# Patient Record
Sex: MALE | Race: BLACK OR AFRICAN AMERICAN | Employment: FULL TIME | URBAN - METROPOLITAN AREA
[De-identification: names, ages, dates, MRNs, and addresses within clinical notes are randomized per-mention and may not be internally consistent; named-entity substitution may affect disease eponyms.]

---

## 2017-01-03 ENCOUNTER — OFFICE VISIT (OUTPATIENT)
Dept: FAMILY MEDICINE CLINIC | Age: 47
End: 2017-01-03

## 2017-01-03 VITALS — DIASTOLIC BLOOD PRESSURE: 90 MMHG | SYSTOLIC BLOOD PRESSURE: 128 MMHG | BODY MASS INDEX: 40.44 KG/M2 | WEIGHT: 298.2 LBS

## 2017-01-03 DIAGNOSIS — I10 ESSENTIAL HYPERTENSION: Primary | Chronic | ICD-10-CM

## 2017-01-03 PROCEDURE — 99213 OFFICE O/P EST LOW 20 MIN: CPT | Performed by: FAMILY MEDICINE

## 2017-02-01 RX ORDER — LOSARTAN POTASSIUM AND HYDROCHLOROTHIAZIDE 25; 100 MG/1; MG/1
TABLET ORAL
Qty: 30 TABLET | Refills: 3 | Status: SHIPPED | OUTPATIENT
Start: 2017-02-01 | End: 2017-06-19 | Stop reason: SDUPTHER

## 2017-02-01 RX ORDER — OMEPRAZOLE 40 MG/1
CAPSULE, DELAYED RELEASE ORAL
Qty: 30 CAPSULE | Refills: 3 | Status: SHIPPED | OUTPATIENT
Start: 2017-02-01 | End: 2017-06-19 | Stop reason: SDUPTHER

## 2017-02-01 RX ORDER — METOPROLOL SUCCINATE 50 MG/1
TABLET, EXTENDED RELEASE ORAL
Qty: 30 TABLET | Refills: 3 | Status: SHIPPED | OUTPATIENT
Start: 2017-02-01 | End: 2017-06-19 | Stop reason: SDUPTHER

## 2017-02-28 RX ORDER — ATORVASTATIN CALCIUM 20 MG/1
TABLET, FILM COATED ORAL
Qty: 30 TABLET | Refills: 3 | Status: SHIPPED | OUTPATIENT
Start: 2017-02-28 | End: 2017-07-17 | Stop reason: SDUPTHER

## 2017-02-28 RX ORDER — AMLODIPINE BESYLATE 5 MG/1
TABLET ORAL
Qty: 30 TABLET | Refills: 3 | Status: SHIPPED | OUTPATIENT
Start: 2017-02-28 | End: 2017-07-17 | Stop reason: SDUPTHER

## 2017-05-01 ENCOUNTER — OFFICE VISIT (OUTPATIENT)
Dept: FAMILY MEDICINE CLINIC | Age: 47
End: 2017-05-01

## 2017-05-01 VITALS
DIASTOLIC BLOOD PRESSURE: 90 MMHG | HEART RATE: 60 BPM | BODY MASS INDEX: 41.96 KG/M2 | OXYGEN SATURATION: 98 % | WEIGHT: 309.4 LBS | SYSTOLIC BLOOD PRESSURE: 122 MMHG

## 2017-05-01 DIAGNOSIS — I10 ESSENTIAL HYPERTENSION: Primary | Chronic | ICD-10-CM

## 2017-05-01 DIAGNOSIS — E78.5 HYPERLIPIDEMIA, UNSPECIFIED HYPERLIPIDEMIA TYPE: Chronic | ICD-10-CM

## 2017-05-01 PROCEDURE — 99213 OFFICE O/P EST LOW 20 MIN: CPT | Performed by: FAMILY MEDICINE

## 2017-06-19 RX ORDER — LOSARTAN POTASSIUM AND HYDROCHLOROTHIAZIDE 25; 100 MG/1; MG/1
TABLET ORAL
Qty: 30 TABLET | Refills: 3 | Status: SHIPPED | OUTPATIENT
Start: 2017-06-19 | End: 2017-11-10 | Stop reason: SDUPTHER

## 2017-06-19 RX ORDER — OMEPRAZOLE 40 MG/1
CAPSULE, DELAYED RELEASE ORAL
Qty: 30 CAPSULE | Refills: 3 | Status: SHIPPED | OUTPATIENT
Start: 2017-06-19 | End: 2017-11-10 | Stop reason: SDUPTHER

## 2017-06-19 RX ORDER — METOPROLOL SUCCINATE 50 MG/1
TABLET, EXTENDED RELEASE ORAL
Qty: 30 TABLET | Refills: 3 | Status: SHIPPED | OUTPATIENT
Start: 2017-06-19 | End: 2017-11-10 | Stop reason: SDUPTHER

## 2017-07-17 RX ORDER — AMLODIPINE BESYLATE 5 MG/1
TABLET ORAL
Qty: 30 TABLET | Refills: 1 | Status: SHIPPED | OUTPATIENT
Start: 2017-07-17 | End: 2017-09-16 | Stop reason: SDUPTHER

## 2017-07-17 RX ORDER — ATORVASTATIN CALCIUM 20 MG/1
TABLET, FILM COATED ORAL
Qty: 30 TABLET | Refills: 1 | Status: SHIPPED | OUTPATIENT
Start: 2017-07-17 | End: 2017-09-16 | Stop reason: SDUPTHER

## 2017-10-10 ENCOUNTER — TELEPHONE (OUTPATIENT)
Dept: FAMILY MEDICINE CLINIC | Age: 47
End: 2017-10-10

## 2017-10-17 ENCOUNTER — TELEPHONE (OUTPATIENT)
Dept: FAMILY MEDICINE CLINIC | Age: 47
End: 2017-10-17

## 2017-10-19 PROBLEM — D12.6 ADENOMATOUS POLYP OF COLON: Status: ACTIVE | Noted: 2017-10-19

## 2017-10-23 RX ORDER — AMLODIPINE BESYLATE 5 MG/1
TABLET ORAL
Qty: 30 TABLET | Refills: 0 | Status: SHIPPED | OUTPATIENT
Start: 2017-10-23 | End: 2017-11-23 | Stop reason: SDUPTHER

## 2017-10-23 RX ORDER — ATORVASTATIN CALCIUM 20 MG/1
TABLET, FILM COATED ORAL
Qty: 30 TABLET | Refills: 0 | OUTPATIENT
Start: 2017-10-23

## 2017-11-10 RX ORDER — ATORVASTATIN CALCIUM 20 MG/1
TABLET, FILM COATED ORAL
Qty: 30 TABLET | Refills: 0 | OUTPATIENT
Start: 2017-11-10

## 2017-11-10 RX ORDER — METOPROLOL SUCCINATE 50 MG/1
TABLET, EXTENDED RELEASE ORAL
Qty: 30 TABLET | Refills: 0 | Status: SHIPPED | OUTPATIENT
Start: 2017-11-10 | End: 2017-11-27 | Stop reason: SDUPTHER

## 2017-11-10 RX ORDER — LOSARTAN POTASSIUM AND HYDROCHLOROTHIAZIDE 25; 100 MG/1; MG/1
TABLET ORAL
Qty: 30 TABLET | Refills: 0 | Status: SHIPPED | OUTPATIENT
Start: 2017-11-10 | End: 2017-11-27 | Stop reason: SDUPTHER

## 2017-11-10 RX ORDER — OMEPRAZOLE 40 MG/1
CAPSULE, DELAYED RELEASE ORAL
Qty: 30 CAPSULE | Refills: 0 | Status: SHIPPED | OUTPATIENT
Start: 2017-11-10 | End: 2017-11-27 | Stop reason: SDUPTHER

## 2017-11-10 NOTE — TELEPHONE ENCOUNTER
Pt came into office to request medication refills:    losartan-hydrochlorothiazide (HYZAAR) 100-25 MG per tablet  atorvastatin (LIPITOR) 20 MG tablet  omeprazole (PRILOSEC) 40 MG delayed release capsule  metoprolol succinate (TOPROL XL) 50 MG extended release tablet

## 2017-11-25 ENCOUNTER — HOSPITAL ENCOUNTER (OUTPATIENT)
Dept: OTHER | Age: 47
Discharge: HOME | End: 2017-11-25
Attending: FAMILY MEDICINE | Admitting: FAMILY MEDICINE

## 2017-11-25 DIAGNOSIS — E78.5 HYPERLIPIDEMIA, UNSPECIFIED HYPERLIPIDEMIA TYPE: Chronic | ICD-10-CM

## 2017-11-25 DIAGNOSIS — I10 ESSENTIAL HYPERTENSION: Chronic | ICD-10-CM

## 2017-11-25 LAB
A/G RATIO: 1.4 (ref 1.1–2.2)
ALBUMIN SERPL-MCNC: 4.2 G/DL (ref 3.4–5)
ALP BLD-CCNC: 68 U/L (ref 40–129)
ALT SERPL-CCNC: 22 U/L (ref 10–40)
ANION GAP SERPL CALCULATED.3IONS-SCNC: 14 MMOL/L (ref 3–16)
AST SERPL-CCNC: 21 U/L (ref 15–37)
BASOPHILS ABSOLUTE: 0 K/UL (ref 0–0.2)
BASOPHILS RELATIVE PERCENT: 1.2 %
BILIRUB SERPL-MCNC: 0.8 MG/DL (ref 0–1)
BUN BLDV-MCNC: 14 MG/DL (ref 7–20)
CALCIUM SERPL-MCNC: 9.6 MG/DL (ref 8.3–10.6)
CHLORIDE BLD-SCNC: 105 MMOL/L (ref 99–110)
CHOLESTEROL, TOTAL: 220 MG/DL (ref 0–199)
CO2: 28 MMOL/L (ref 21–32)
CREAT SERPL-MCNC: 1.2 MG/DL (ref 0.9–1.3)
CREATININE URINE: 144.2 MG/DL (ref 39–259)
EOSINOPHILS ABSOLUTE: 0.1 K/UL (ref 0–0.6)
EOSINOPHILS RELATIVE PERCENT: 3.6 %
GFR AFRICAN AMERICAN: >60
GFR NON-AFRICAN AMERICAN: >60
GLOBULIN: 2.9 G/DL
GLUCOSE BLD-MCNC: 95 MG/DL (ref 70–99)
HCT VFR BLD CALC: 45 % (ref 40.5–52.5)
HDLC SERPL-MCNC: 57 MG/DL (ref 40–60)
HEMOGLOBIN: 15.2 G/DL (ref 13.5–17.5)
LDL CHOLESTEROL CALCULATED: 132 MG/DL
LYMPHOCYTES ABSOLUTE: 1.7 K/UL (ref 1–5.1)
LYMPHOCYTES RELATIVE PERCENT: 45.4 %
MCH RBC QN AUTO: 30.6 PG (ref 26–34)
MCHC RBC AUTO-ENTMCNC: 33.7 G/DL (ref 31–36)
MCV RBC AUTO: 91 FL (ref 80–100)
MICROALBUMIN UR-MCNC: <1.2 MG/DL
MICROALBUMIN/CREAT UR-RTO: NORMAL MG/G (ref 0–30)
MONOCYTES ABSOLUTE: 0.4 K/UL (ref 0–1.3)
MONOCYTES RELATIVE PERCENT: 10.7 %
NEUTROPHILS ABSOLUTE: 1.5 K/UL (ref 1.7–7.7)
NEUTROPHILS RELATIVE PERCENT: 39.1 %
PDW BLD-RTO: 13.1 % (ref 12.4–15.4)
PLATELET # BLD: 231 K/UL (ref 135–450)
PMV BLD AUTO: 8.9 FL (ref 5–10.5)
POTASSIUM SERPL-SCNC: 4.8 MMOL/L (ref 3.5–5.1)
RBC # BLD: 4.95 M/UL (ref 4.2–5.9)
SODIUM BLD-SCNC: 147 MMOL/L (ref 136–145)
TOTAL PROTEIN: 7.1 G/DL (ref 6.4–8.2)
TRIGL SERPL-MCNC: 156 MG/DL (ref 0–150)
VLDLC SERPL CALC-MCNC: 31 MG/DL
WBC # BLD: 3.8 K/UL (ref 4–11)

## 2017-11-26 LAB
ESTIMATED AVERAGE GLUCOSE: 116.9 MG/DL
HBA1C MFR BLD: 5.7 %

## 2017-11-27 ENCOUNTER — OFFICE VISIT (OUTPATIENT)
Dept: FAMILY MEDICINE CLINIC | Age: 47
End: 2017-11-27

## 2017-11-27 VITALS
HEART RATE: 85 BPM | BODY MASS INDEX: 37.38 KG/M2 | SYSTOLIC BLOOD PRESSURE: 120 MMHG | DIASTOLIC BLOOD PRESSURE: 84 MMHG | WEIGHT: 275.6 LBS | OXYGEN SATURATION: 97 %

## 2017-11-27 DIAGNOSIS — E78.49 OTHER HYPERLIPIDEMIA: Chronic | ICD-10-CM

## 2017-11-27 DIAGNOSIS — K21.9 GASTROESOPHAGEAL REFLUX DISEASE, ESOPHAGITIS PRESENCE NOT SPECIFIED: ICD-10-CM

## 2017-11-27 DIAGNOSIS — I10 ESSENTIAL HYPERTENSION: Primary | Chronic | ICD-10-CM

## 2017-11-27 PROCEDURE — G8484 FLU IMMUNIZE NO ADMIN: HCPCS | Performed by: FAMILY MEDICINE

## 2017-11-27 PROCEDURE — 1036F TOBACCO NON-USER: CPT | Performed by: FAMILY MEDICINE

## 2017-11-27 PROCEDURE — G8427 DOCREV CUR MEDS BY ELIG CLIN: HCPCS | Performed by: FAMILY MEDICINE

## 2017-11-27 PROCEDURE — G8417 CALC BMI ABV UP PARAM F/U: HCPCS | Performed by: FAMILY MEDICINE

## 2017-11-27 PROCEDURE — 99214 OFFICE O/P EST MOD 30 MIN: CPT | Performed by: FAMILY MEDICINE

## 2017-11-27 RX ORDER — OMEPRAZOLE 40 MG/1
CAPSULE, DELAYED RELEASE ORAL
Qty: 30 CAPSULE | Refills: 5 | Status: SHIPPED | OUTPATIENT
Start: 2017-11-27 | End: 2018-08-24 | Stop reason: SDUPTHER

## 2017-11-27 RX ORDER — LOSARTAN POTASSIUM AND HYDROCHLOROTHIAZIDE 25; 100 MG/1; MG/1
TABLET ORAL
Qty: 30 TABLET | Refills: 5 | Status: SHIPPED | OUTPATIENT
Start: 2017-11-27 | End: 2018-08-24 | Stop reason: SDUPTHER

## 2017-11-27 RX ORDER — METOPROLOL SUCCINATE 50 MG/1
TABLET, EXTENDED RELEASE ORAL
Qty: 30 TABLET | Refills: 5 | Status: SHIPPED | OUTPATIENT
Start: 2017-11-27 | End: 2018-08-24 | Stop reason: SDUPTHER

## 2017-11-27 RX ORDER — AMLODIPINE BESYLATE 5 MG/1
TABLET ORAL
Qty: 30 TABLET | Refills: 5 | Status: SHIPPED | OUTPATIENT
Start: 2017-11-27 | End: 2018-06-19 | Stop reason: SDUPTHER

## 2017-11-27 RX ORDER — ATORVASTATIN CALCIUM 20 MG/1
TABLET, FILM COATED ORAL
Qty: 30 TABLET | Refills: 5 | Status: SHIPPED | OUTPATIENT
Start: 2017-11-27 | End: 2018-07-17 | Stop reason: SDUPTHER

## 2017-11-27 NOTE — PROGRESS NOTES
History   Smoking Status    Former Smoker    Years: 10.00   Smokeless Tobacco    Never Used     PMH, surgeries, SH, FH, allergies reviewed. Medication list reviewed and updated. Chief Complaint   Patient presents with    Hypertension     Hypertension:  Denies chest pain, SOB, cough, visual changes, dizziness, palpitations or headache. He is adherent to a low sodium diet. He is  adherent to an exercise program.  He is  compliant with medications. Blood pressure typically runs unknown outside of the office. Objective:   VS reviewed    Vitals:    11/27/17 1439   BP: 120/84   Site: Left Arm   Position: Sitting   Cuff Size: Large Adult   Pulse: 85   SpO2: 97%   Weight: 275 lb 9.6 oz (125 kg)     Body mass index is 37.38 kg/m². Wt Readings from Last 3 Encounters:   11/27/17 275 lb 9.6 oz (125 kg)   10/16/17 297 lb 2.9 oz (134.8 kg)   07/01/17 300 lb (136.1 kg)     BP Readings from Last 3 Encounters:   11/27/17 120/84   10/16/17 122/70   07/01/17 130/70       Physical Exam   Constitutional: He is oriented to person, place, and time. No distress. Neck: No JVD present. No thyromegaly present. Cardiovascular: Normal rate, regular rhythm, normal heart sounds and intact distal pulses. No murmur heard. Pulmonary/Chest: Effort normal and breath sounds normal. No respiratory distress. Musculoskeletal: He exhibits no edema. Neurological: He is alert and oriented to person, place, and time.    Psychiatric: Mood, memory, affect and judgment normal.       Lab Results   Component Value Date     (H) 11/25/2017    K 4.8 11/25/2017     11/25/2017    CO2 28 11/25/2017    BUN 14 11/25/2017    CREATININE 1.2 11/25/2017    GLUCOSE 95 11/25/2017    CALCIUM 9.6 11/25/2017    PROT 7.1 11/25/2017    LABALBU 4.2 11/25/2017    BILITOT 0.8 11/25/2017    ALKPHOS 68 11/25/2017    AST 21 11/25/2017    ALT 22 11/25/2017    LABGLOM >60 11/25/2017    GFRAA >60 11/25/2017    AGRATIO 1.4 11/25/2017    GLOB 2.9 11/25/2017       Lab Results   Component Value Date    WBC 3.8 (L) 11/25/2017    HGB 15.2 11/25/2017    HCT 45.0 11/25/2017    MCV 91.0 11/25/2017     11/25/2017       Lab Results   Component Value Date    CHOL 220 (H) 11/25/2017    CHOL 145 10/13/2016    CHOL 164 12/30/2014     Lab Results   Component Value Date    TRIG 156 (H) 11/25/2017    TRIG 61 10/13/2016    TRIG 108 12/30/2014     Lab Results   Component Value Date    HDL 57 11/25/2017    HDL 59 10/13/2016    HDL 57 12/30/2014     Lab Results   Component Value Date    LDLCALC 132 (H) 11/25/2017    LDLCALC 74 10/13/2016    LDLCALC 85 12/30/2014     Lab Results   Component Value Date    LABVLDL 31 11/25/2017    LABVLDL 12 10/13/2016    LABVLDL 22 12/30/2014     No results found for: [facility]    Lab Results   Component Value Date    LABMICR <1.20 11/25/2017       Lab Results   Component Value Date    LABA1C 5.7 11/25/2017    LABA1C 5.5 10/13/2016    LABA1C 5.5 09/02/2014     Lab Results   Component Value Date    .9 11/25/2017       Assessment/Plan:    1. Essential hypertension  2. Other hyperlipidemia  well controlled HTN. Lipids are acceptable he has been off statin for about a month. Lipids were good while on statin. Restart statin. Reports o myalgias and normal hepatic function. Kidney function and screening for nephropathy are normal/negative. He is exercising and eating healthier he has lost 25 lbs in the last 6 months, patient was encouraged to continue a healthy lifestyle. 3. GERD: on omeprazole daily reports severe heartburn and significant night symptoms when tried to stopped taking. Return in about 6 months (around 5/27/2018) for HTN . Angela Webb received counseling on the following healthy behaviors: nutrition and exercise    Discussed use, benefit, and side effects of prescribed medications. Barriers to medication compliance addressed. All patient questions answered. Pt voiced understanding.

## 2018-01-15 ENCOUNTER — OFFICE VISIT (OUTPATIENT)
Dept: FAMILY MEDICINE CLINIC | Age: 48
End: 2018-01-15

## 2018-01-15 VITALS
DIASTOLIC BLOOD PRESSURE: 80 MMHG | SYSTOLIC BLOOD PRESSURE: 122 MMHG | HEART RATE: 81 BPM | WEIGHT: 300 LBS | BODY MASS INDEX: 40.69 KG/M2 | OXYGEN SATURATION: 98 %

## 2018-01-15 DIAGNOSIS — M54.41 CHRONIC RIGHT-SIDED LOW BACK PAIN WITH RIGHT-SIDED SCIATICA: Primary | ICD-10-CM

## 2018-01-15 DIAGNOSIS — G89.29 CHRONIC RIGHT-SIDED LOW BACK PAIN WITH RIGHT-SIDED SCIATICA: Primary | ICD-10-CM

## 2018-01-15 PROCEDURE — G8417 CALC BMI ABV UP PARAM F/U: HCPCS | Performed by: FAMILY MEDICINE

## 2018-01-15 PROCEDURE — 99213 OFFICE O/P EST LOW 20 MIN: CPT | Performed by: FAMILY MEDICINE

## 2018-01-15 PROCEDURE — 1036F TOBACCO NON-USER: CPT | Performed by: FAMILY MEDICINE

## 2018-01-15 PROCEDURE — G8484 FLU IMMUNIZE NO ADMIN: HCPCS | Performed by: FAMILY MEDICINE

## 2018-01-15 PROCEDURE — G8427 DOCREV CUR MEDS BY ELIG CLIN: HCPCS | Performed by: FAMILY MEDICINE

## 2018-01-15 RX ORDER — CYCLOBENZAPRINE HCL 10 MG
10 TABLET ORAL 3 TIMES DAILY PRN
Qty: 30 TABLET | Refills: 0 | Status: SHIPPED | OUTPATIENT
Start: 2018-01-15 | End: 2018-05-30

## 2018-01-15 NOTE — PROGRESS NOTES
History   Smoking Status    Former Smoker    Years: 10.00   Smokeless Tobacco    Never Used     PMH, surgeries, SH, FH, allergies reviewed. Medication list reviewed and updated. Chief Complaint   Patient presents with    Referral - General     Would like a referral to a specialist for sciatica      Seen at ER some days ago for right low back pain gradual onset, no injuries. He is currently on prednisone and flexeril and he is feeling better. Intensity 4/10/ Pain radiates down to the back of his right leg above his knee. No numbness or weakness of extremities. No urinary or stool incontinence. No dysuria or urgency. Has had low back pain for years with several excerebration in the last year. Has never has PT. Job requires for him to bends , reach, lift  and move side to side frequently. Objective:   VS reviewed    Vitals:    01/15/18 1539   BP: 122/80   Site: Left Arm   Position: Sitting   Cuff Size: Large Adult   Pulse: 81   SpO2: 98%   Weight: 300 lb (136.1 kg)     Body mass index is 40.69 kg/m². Wt Readings from Last 3 Encounters:   01/15/18 300 lb (136.1 kg)   01/11/18 (!) 303 lb 2.1 oz (137.5 kg)   12/13/17 299 lb 9.7 oz (135.9 kg)     BP Readings from Last 3 Encounters:   01/15/18 122/80   01/11/18 119/82   12/13/17 131/84       Physical Exam   Constitutional: He is oriented to person, place, and time. No distress. Cardiovascular: Normal rate and regular rhythm. Pulmonary/Chest: No respiratory distress. Abdominal: Soft. He exhibits no distension. There is no tenderness. Musculoskeletal:        Lumbar back: He exhibits spasm. He exhibits normal range of motion, no bony tenderness, no swelling, no edema and normal pulse. Back:    Normal straight leg bilateral, can heel and toe walk. Neurological: He is alert and oriented to person, place, and time. Skin: No rash noted. No erythema. No pallor. Assessment/Plan:    1.  Chronic right-sided low back pain with right-sided sciatica  Recent lumbar xray report reviewed. Course of disease and treatment expectations discussed. Flexeril PRN. Patient advised of medication side effects, including sedation. Advised to avoid driving and or operating machinery while taking this medication. Start PT referred spine pain/specialist   - [facility]  - Ambulatory referral to Orthopedic Surgery      Return for in may for HTN . Discussed use, benefit, and side effects of prescribed medications. Barriers to medication compliance addressed. All patient questions answered. Pt voiced understanding. Current Outpatient Prescriptions   Medication Sig Dispense Refill    cyclobenzaprine (FLEXERIL) 10 MG tablet Take 1 tablet by mouth 3 times daily as needed for Muscle spasms 30 tablet 0    atorvastatin (LIPITOR) 20 MG tablet TAKE 1 TABLET BY MOUTH EVERY DAY 30 tablet 5    amLODIPine (NORVASC) 5 MG tablet TAKE 1 TABLET BY MOUTH DAILY 30 tablet 5    losartan-hydrochlorothiazide (HYZAAR) 100-25 MG per tablet TAKE 1 TABLET BY MOUTH DAILY 30 tablet 5    omeprazole (PRILOSEC) 40 MG delayed release capsule TAKE 1 CAPSULE BY MOUTH DAILY 30 capsule 5    metoprolol succinate (TOPROL XL) 50 MG extended release tablet TAKE 1 TABLET BY MOUTH DAILY 30 tablet 5    ibuprofen (ADVIL;MOTRIN) 800 MG tablet Take 1 tablet by mouth every 8 hours as needed for Pain 20 tablet 0    predniSONE (DELTASONE) 10 MG tablet Take 4 tablets by mouth for 2 days, then 3, 2, 1 tablets daily for 1 day each. 14 tablet 0    methocarbamol (ROBAXIN-750) 750 MG tablet Take 1 tablet by mouth 4 times daily as needed (muscle spasm/pain) 30 tablet 0    naproxen (NAPROSYN) 500 MG tablet Take 1 tablet by mouth 2 times daily (with meals) 30 tablet 0    ibuprofen (ADVIL;MOTRIN) 600 MG tablet Take 1 tablet by mouth every 8 hours as needed for Pain 120 tablet 0     No current facility-administered medications for this visit.

## 2018-05-18 RX ORDER — EPINEPHRINE 0.3 MG/.3ML
INJECTION SUBCUTANEOUS
Status: CANCELLED | OUTPATIENT
Start: 2018-05-18

## 2018-05-18 RX ORDER — EPINEPHRINE 0.3 MG/.3ML
0.3 INJECTION SUBCUTANEOUS ONCE
Qty: 0.3 ML | Refills: 0 | Status: SHIPPED | OUTPATIENT
Start: 2018-05-18 | End: 2018-05-18

## 2018-05-30 ENCOUNTER — OFFICE VISIT (OUTPATIENT)
Dept: FAMILY MEDICINE CLINIC | Age: 48
End: 2018-05-30

## 2018-05-30 VITALS
SYSTOLIC BLOOD PRESSURE: 120 MMHG | OXYGEN SATURATION: 98 % | WEIGHT: 308 LBS | BODY MASS INDEX: 41.77 KG/M2 | HEART RATE: 75 BPM | DIASTOLIC BLOOD PRESSURE: 86 MMHG

## 2018-05-30 DIAGNOSIS — E78.49 OTHER HYPERLIPIDEMIA: Chronic | ICD-10-CM

## 2018-05-30 DIAGNOSIS — I10 ESSENTIAL HYPERTENSION: Primary | Chronic | ICD-10-CM

## 2018-05-30 PROCEDURE — G8417 CALC BMI ABV UP PARAM F/U: HCPCS | Performed by: FAMILY MEDICINE

## 2018-05-30 PROCEDURE — G8427 DOCREV CUR MEDS BY ELIG CLIN: HCPCS | Performed by: FAMILY MEDICINE

## 2018-05-30 PROCEDURE — 1036F TOBACCO NON-USER: CPT | Performed by: FAMILY MEDICINE

## 2018-05-30 PROCEDURE — 99213 OFFICE O/P EST LOW 20 MIN: CPT | Performed by: FAMILY MEDICINE

## 2018-05-30 RX ORDER — EPINEPHRINE 0.3 MG/.3ML
INJECTION INTRAMUSCULAR
Refills: 0 | COMMUNITY
Start: 2018-05-23

## 2018-07-13 DIAGNOSIS — I10 ESSENTIAL HYPERTENSION: Chronic | ICD-10-CM

## 2018-07-13 DIAGNOSIS — E78.49 OTHER HYPERLIPIDEMIA: Chronic | ICD-10-CM

## 2018-07-13 LAB
A/G RATIO: 1.9 (ref 1.1–2.2)
ALBUMIN SERPL-MCNC: 4.1 G/DL (ref 3.4–5)
ALP BLD-CCNC: 63 U/L (ref 40–129)
ALT SERPL-CCNC: 41 U/L (ref 10–40)
ANION GAP SERPL CALCULATED.3IONS-SCNC: 12 MMOL/L (ref 3–16)
AST SERPL-CCNC: 31 U/L (ref 15–37)
BASOPHILS ABSOLUTE: 0 K/UL (ref 0–0.2)
BASOPHILS RELATIVE PERCENT: 1 %
BILIRUB SERPL-MCNC: 1 MG/DL (ref 0–1)
BUN BLDV-MCNC: 15 MG/DL (ref 7–20)
CALCIUM SERPL-MCNC: 9.3 MG/DL (ref 8.3–10.6)
CHLORIDE BLD-SCNC: 108 MMOL/L (ref 99–110)
CHOLESTEROL, TOTAL: 156 MG/DL (ref 0–199)
CO2: 26 MMOL/L (ref 21–32)
CREAT SERPL-MCNC: 1 MG/DL (ref 0.9–1.3)
CREATININE URINE: 267.1 MG/DL (ref 39–259)
EOSINOPHILS ABSOLUTE: 0.1 K/UL (ref 0–0.6)
EOSINOPHILS RELATIVE PERCENT: 3.4 %
GFR AFRICAN AMERICAN: >60
GFR NON-AFRICAN AMERICAN: >60
GLOBULIN: 2.2 G/DL
GLUCOSE BLD-MCNC: 102 MG/DL (ref 70–99)
HCT VFR BLD CALC: 39.1 % (ref 40.5–52.5)
HDLC SERPL-MCNC: 52 MG/DL (ref 40–60)
HEMOGLOBIN: 13.9 G/DL (ref 13.5–17.5)
LDL CHOLESTEROL CALCULATED: 77 MG/DL
LYMPHOCYTES ABSOLUTE: 1.4 K/UL (ref 1–5.1)
LYMPHOCYTES RELATIVE PERCENT: 38 %
MCH RBC QN AUTO: 31 PG (ref 26–34)
MCHC RBC AUTO-ENTMCNC: 35.4 G/DL (ref 31–36)
MCV RBC AUTO: 87.6 FL (ref 80–100)
MICROALBUMIN UR-MCNC: <1.2 MG/DL
MICROALBUMIN/CREAT UR-RTO: ABNORMAL MG/G (ref 0–30)
MONOCYTES ABSOLUTE: 0.3 K/UL (ref 0–1.3)
MONOCYTES RELATIVE PERCENT: 7.2 %
NEUTROPHILS ABSOLUTE: 1.9 K/UL (ref 1.7–7.7)
NEUTROPHILS RELATIVE PERCENT: 50.4 %
PDW BLD-RTO: 13.1 % (ref 12.4–15.4)
PLATELET # BLD: 206 K/UL (ref 135–450)
PMV BLD AUTO: 8.4 FL (ref 5–10.5)
POTASSIUM SERPL-SCNC: 3.9 MMOL/L (ref 3.5–5.1)
RBC # BLD: 4.46 M/UL (ref 4.2–5.9)
SODIUM BLD-SCNC: 146 MMOL/L (ref 136–145)
TOTAL PROTEIN: 6.3 G/DL (ref 6.4–8.2)
TRIGL SERPL-MCNC: 136 MG/DL (ref 0–150)
VLDLC SERPL CALC-MCNC: 27 MG/DL
WBC # BLD: 3.8 K/UL (ref 4–11)

## 2018-10-26 RX ORDER — ATORVASTATIN CALCIUM 20 MG/1
TABLET, FILM COATED ORAL
Qty: 90 TABLET | Refills: 0 | Status: SHIPPED | OUTPATIENT
Start: 2018-10-26

## 2018-10-26 RX ORDER — LOSARTAN POTASSIUM AND HYDROCHLOROTHIAZIDE 25; 100 MG/1; MG/1
TABLET ORAL
Qty: 90 TABLET | Refills: 0 | Status: SHIPPED | OUTPATIENT
Start: 2018-10-26

## 2018-10-26 RX ORDER — METOPROLOL SUCCINATE 50 MG/1
TABLET, EXTENDED RELEASE ORAL
Qty: 90 TABLET | Refills: 0 | Status: SHIPPED | OUTPATIENT
Start: 2018-10-26

## 2018-10-26 RX ORDER — AMLODIPINE BESYLATE 5 MG/1
TABLET ORAL
Qty: 90 TABLET | Refills: 0 | Status: SHIPPED | OUTPATIENT
Start: 2018-10-26

## 2018-10-26 NOTE — TELEPHONE ENCOUNTER
Pt is requesting refills for amlodipine, atorvastatin, losartan and metoprolol.  Please pend to [facility]

## 2019-01-07 RX ORDER — OMEPRAZOLE 40 MG/1
CAPSULE, DELAYED RELEASE ORAL
Qty: 90 CAPSULE | Refills: 0 | Status: SHIPPED | OUTPATIENT
Start: 2019-01-07 | End: 2019-05-05 | Stop reason: SDUPTHER

## 2019-03-01 ENCOUNTER — TELEPHONE (OUTPATIENT)
Dept: ORTHOPEDIC SURGERY | Age: 49
End: 2019-03-01

## 2019-03-01 ENCOUNTER — OFFICE VISIT (OUTPATIENT)
Dept: ORTHOPEDIC SURGERY | Age: 49
End: 2019-03-01
Payer: COMMERCIAL

## 2019-03-01 ENCOUNTER — PRE-EVALUATION (OUTPATIENT)
Dept: ORTHOPEDIC SURGERY | Age: 49
End: 2019-03-01

## 2019-03-01 VITALS
RESPIRATION RATE: 16 BRPM | SYSTOLIC BLOOD PRESSURE: 117 MMHG | HEIGHT: 72 IN | BODY MASS INDEX: 41.72 KG/M2 | HEART RATE: 67 BPM | DIASTOLIC BLOOD PRESSURE: 76 MMHG | WEIGHT: 308 LBS

## 2019-03-01 DIAGNOSIS — G56.02 LEFT CARPAL TUNNEL SYNDROME: ICD-10-CM

## 2019-03-01 DIAGNOSIS — R20.0 HAND NUMBNESS: Primary | ICD-10-CM

## 2019-03-01 DIAGNOSIS — G56.02 CARPAL TUNNEL SYNDROME OF LEFT WRIST: ICD-10-CM

## 2019-03-01 PROCEDURE — 20526 THER INJECTION CARP TUNNEL: CPT | Performed by: ORTHOPAEDIC SURGERY

## 2019-03-01 PROCEDURE — APPNB15 APP NON BILLABLE TIME 0-15 MINS: Performed by: PHYSICIAN ASSISTANT

## 2019-03-01 PROCEDURE — 99243 OFF/OP CNSLTJ NEW/EST LOW 30: CPT | Performed by: ORTHOPAEDIC SURGERY

## 2019-05-06 RX ORDER — OMEPRAZOLE 40 MG/1
CAPSULE, DELAYED RELEASE ORAL
Qty: 90 CAPSULE | Refills: 0 | Status: SHIPPED | OUTPATIENT
Start: 2019-05-06

## 2020-02-24 ENCOUNTER — HOSPITAL ENCOUNTER (EMERGENCY)
Age: 50
Discharge: HOME | End: 2020-02-24
Attending: EMERGENCY MEDICINE
Payer: COMMERCIAL

## 2020-02-24 ENCOUNTER — APPOINTMENT (OUTPATIENT)
Dept: GENERAL RADIOLOGY | Age: 50
End: 2020-02-24
Payer: COMMERCIAL

## 2020-02-24 VITALS
WEIGHT: 307.98 LBS | HEIGHT: 72 IN | DIASTOLIC BLOOD PRESSURE: 78 MMHG | TEMPERATURE: 98.7 F | SYSTOLIC BLOOD PRESSURE: 105 MMHG | BODY MASS INDEX: 41.72 KG/M2 | HEART RATE: 66 BPM | RESPIRATION RATE: 9 BRPM | OXYGEN SATURATION: 98 %

## 2020-02-24 LAB
A/G RATIO: 1.2 (ref 1.1–2.2)
ALBUMIN SERPL-MCNC: 3.8 G/DL (ref 3.4–5)
ALP BLD-CCNC: 63 U/L (ref 40–129)
ALT SERPL-CCNC: 18 U/L (ref 10–40)
ANION GAP SERPL CALCULATED.3IONS-SCNC: 13 MMOL/L (ref 3–16)
AST SERPL-CCNC: 23 U/L (ref 15–37)
BASOPHILS ABSOLUTE: 0 K/UL (ref 0–0.2)
BASOPHILS RELATIVE PERCENT: 0.8 %
BILIRUB SERPL-MCNC: 0.9 MG/DL (ref 0–1)
BUN BLDV-MCNC: 14 MG/DL (ref 7–20)
CALCIUM SERPL-MCNC: 9.1 MG/DL (ref 8.3–10.6)
CHLORIDE BLD-SCNC: 101 MMOL/L (ref 99–110)
CO2: 25 MMOL/L (ref 21–32)
CREAT SERPL-MCNC: 1 MG/DL (ref 0.9–1.3)
EOSINOPHILS ABSOLUTE: 0.2 K/UL (ref 0–0.6)
EOSINOPHILS RELATIVE PERCENT: 4.2 %
GFR AFRICAN AMERICAN: >60
GFR NON-AFRICAN AMERICAN: >60
GLOBULIN: 3.3 G/DL
GLUCOSE BLD-MCNC: 95 MG/DL (ref 70–99)
HCT VFR BLD CALC: 42 % (ref 40.5–52.5)
HEMOGLOBIN: 14.5 G/DL (ref 13.5–17.5)
LIPASE: 16 U/L (ref 13–60)
LYMPHOCYTES ABSOLUTE: 1.3 K/UL (ref 1–5.1)
LYMPHOCYTES RELATIVE PERCENT: 28.5 %
MCH RBC QN AUTO: 30.8 PG (ref 26–34)
MCHC RBC AUTO-ENTMCNC: 34.5 G/DL (ref 31–36)
MCV RBC AUTO: 89.3 FL (ref 80–100)
MONOCYTES ABSOLUTE: 0.5 K/UL (ref 0–1.3)
MONOCYTES RELATIVE PERCENT: 10.2 %
NEUTROPHILS ABSOLUTE: 2.5 K/UL (ref 1.7–7.7)
NEUTROPHILS RELATIVE PERCENT: 56.3 %
PDW BLD-RTO: 12.8 % (ref 12.4–15.4)
PLATELET # BLD: 194 K/UL (ref 135–450)
PMV BLD AUTO: 8.1 FL (ref 5–10.5)
POTASSIUM REFLEX MAGNESIUM: 4.5 MMOL/L (ref 3.5–5.1)
RBC # BLD: 4.71 M/UL (ref 4.2–5.9)
SODIUM BLD-SCNC: 139 MMOL/L (ref 136–145)
TOTAL PROTEIN: 7.1 G/DL (ref 6.4–8.2)
TROPONIN: <0.01 NG/ML
WBC # BLD: 4.4 K/UL (ref 4–11)

## 2020-02-24 PROCEDURE — 85025 COMPLETE CBC W/AUTO DIFF WBC: CPT

## 2020-02-24 PROCEDURE — 36415 COLL VENOUS BLD VENIPUNCTURE: CPT

## 2020-02-24 PROCEDURE — 83690 ASSAY OF LIPASE: CPT

## 2020-02-24 PROCEDURE — 71045 X-RAY EXAM CHEST 1 VIEW: CPT

## 2020-02-24 PROCEDURE — 6370000000 HC RX 637 (ALT 250 FOR IP): Performed by: NURSE PRACTITIONER

## 2020-02-24 PROCEDURE — 80053 COMPREHEN METABOLIC PANEL: CPT

## 2020-02-24 PROCEDURE — 93005 ELECTROCARDIOGRAM TRACING: CPT | Performed by: NURSE PRACTITIONER

## 2020-02-24 PROCEDURE — 84484 ASSAY OF TROPONIN QUANT: CPT

## 2020-02-24 PROCEDURE — 99285 EMERGENCY DEPT VISIT HI MDM: CPT

## 2020-02-24 RX ORDER — ASPIRIN 81 MG/1
324 TABLET, CHEWABLE ORAL ONCE
Status: COMPLETED | OUTPATIENT
Start: 2020-02-24 | End: 2020-02-24

## 2020-02-24 RX ADMIN — ASPIRIN 81 MG 324 MG: 81 TABLET ORAL at 16:46

## 2020-02-24 ASSESSMENT — ENCOUNTER SYMPTOMS
COUGH: 0
ABDOMINAL PAIN: 0
ABDOMINAL DISTENTION: 0
BLOOD IN STOOL: 0
DIARRHEA: 0
VOMITING: 0
CONSTIPATION: 0
NAUSEA: 0
SHORTNESS OF BREATH: 0
BACK PAIN: 0

## 2020-02-24 ASSESSMENT — HEART SCORE: ECG: 0

## 2020-02-24 NOTE — ED NOTES
Pt placed on/continued on cardiac monitor and continuous pulse ox - see flowsheet     Rishi Cee, MARY  02/24/20 4531

## 2020-02-24 NOTE — ED PROVIDER NOTES
blood in stool, constipation, diarrhea, nausea and vomiting. Musculoskeletal: Negative for back pain. Skin: Negative for pallor and rash. Allergic/Immunologic: Negative for immunocompromised state. Neurological: Negative for dizziness, syncope, weakness, numbness and headaches. Hematological: Negative for adenopathy. Psychiatric/Behavioral: Negative for confusion. All other systems reviewed and are negative. Positives and Pertinent negatives as per HPI. Except as noted above in the ROS, all other systems were reviewed and negative. PAST MEDICAL HISTORY     Past Medical History:   Diagnosis Date    Adenomatous polyp of colon 10/19/2017    Colonoscopy 10/2017 repeat in 5 years.     GERD (gastroesophageal reflux disease)     Hyperlipidemia     Hypertension     Obstructive sleep apnea (adult) (pediatric)          SURGICAL HISTORY     Past Surgical History:   Procedure Laterality Date    ANKLE ARTHROSCOPY      TONSILLECTOMY AND ADENOIDECTOMY           CURRENTMEDICATIONS       Previous Medications    AMLODIPINE (NORVASC) 5 MG TABLET    TAKE 1 TABLET BY MOUTH DAILY    ATORVASTATIN (LIPITOR) 20 MG TABLET    TAKE 1 TABLET BY MOUTH EVERY DAY    EPIPEN 2-NATIVIDAD 0.3 MG/0.3ML SOAJ INJECTION    ADM 0.3 ML IM 1 TIME FOR 1 DOSE UTD FOR ALLERGIC REACTION    IBUPROFEN (ADVIL;MOTRIN) 800 MG TABLET    Take 1 tablet by mouth every 8 hours as needed for Pain    LOSARTAN-HYDROCHLOROTHIAZIDE (HYZAAR) 100-25 MG PER TABLET    TAKE 1 TABLET BY MOUTH DAILY    METOPROLOL SUCCINATE (TOPROL XL) 50 MG EXTENDED RELEASE TABLET    TAKE 1 TABLET BY MOUTH DAILY    OMEPRAZOLE (PRILOSEC) 40 MG DELAYED RELEASE CAPSULE    TAKE ONE CAPSULE BY MOUTH DAILY         ALLERGIES     Shellfish-derived products    FAMILYHISTORY       Family History   Problem Relation Age of Onset    Hypertension Mother     Heart Disease Father     Heart Disease Brother     Depression Brother           SOCIAL HISTORY       Social History     Tobacco Use    Smoking status: Former Smoker     Years: 10.00    Smokeless tobacco: Never Used   Substance Use Topics    Alcohol use: Yes     Comment: OCC    Drug use: No       SCREENINGS      Heart Score for chest pain patients  History: Slightly Suspicious  ECG: Normal  Patient Age: > 39 and < 65 years  *Risk factors for Atherosclerotic disease: Hypercholesterolemia, Hypertension, Positive family History  Risk Factors: > 3 Risk factors or history of atherosclerotic disease*  Troponin: < 1X normal limit  Heart Score Total: 3      PHYSICAL EXAM    (up to 7 for level 4, 8 or more for level 5)     ED Triage Vitals [02/24/20 1602]   BP Temp Temp Source Pulse Resp SpO2 Height Weight   119/76 98.7 °F (37.1 °C) Oral 80 16 100 % 6' (1.829 m) (!) 307 lb 15.7 oz (139.7 kg)       Physical Exam  Vitals signs and nursing note reviewed. Constitutional:       General: He is not in acute distress. Appearance: Normal appearance. He is well-developed. He is not toxic-appearing. HENT:      Head: Normocephalic and atraumatic. Eyes:      General: No scleral icterus. Conjunctiva/sclera: Conjunctivae normal.   Neck:      Musculoskeletal: Normal range of motion. Vascular: No JVD. Cardiovascular:      Rate and Rhythm: Normal rate and regular rhythm. Heart sounds: No murmur. No friction rub. No gallop. Pulmonary:      Effort: Pulmonary effort is normal. No respiratory distress. Breath sounds: Normal breath sounds. Abdominal:      General: Bowel sounds are normal. There is no distension. Palpations: Abdomen is soft. Abdomen is not rigid. Tenderness: There is no abdominal tenderness. There is no guarding or rebound. Musculoskeletal: Normal range of motion. Skin:     General: Skin is warm and dry. Capillary Refill: Capillary refill takes less than 2 seconds. Findings: No rash. Neurological:      General: No focal deficit present.       Mental Status: He is alert and oriented to person, Patient was instructed to follow up with primary care provider in 24-48 hours, and to instructed to return to ED immediately for any new or worsening concerns. Ebenezer Evans verbalized understanding and discharged home. The patient tolerated their visit well. They were seen and evaluated by the attending physician, Magdiel Rosenthal MD who agreed with the assessment and plan. The patient and / or the family were informed of the results of any tests, a time was given to answer questions, a plan was proposed and they agreed with plan. FINAL IMPRESSION      1.  Chest pain, unspecified type          DISPOSITION/PLAN   DISPOSITION Decision To Discharge 02/24/2020 07:48:48 PM      PATIENT REFERREDTO:  Rita Tate MD  [address]    Schedule an appointment as soon as possible for a visit       Sherlyn Lord, [address] [place] [address] [address] [place] [address]  [phone]    Schedule an appointment as soon as possible for a visit       [facility] Emergency Department  [address] [id number] [phone]  Go to   As needed      DISCHARGE MEDICATIONS:  New Prescriptions    No medications on file       DISCONTINUED MEDICATIONS:  Discontinued Medications    No medications on file              (Please note that portions of this note were completed with a voice recognition program.  Efforts were made to edit the dictations but occasionally words are mis-transcribed.)    MARISEL Carrillo CNP (electronically signed)           MARISEL Carrillo CNP  02/24/20 2046

## 2020-02-24 NOTE — ED PROVIDER NOTES
Attending Supervisory Note/Shared Visit   I have personally performed a face to face diagnostic evaluation on this patient. I have reviewed the mid-levels findings and agree. History and Exam by me shows alert black male no acute distress. He had some tightness in his lower chest and upper abdomen this morning around 11 AM.  He had been to the gym working out. He had finished his workout. He states it was a vague tightness it can wax and wane. He states that lasted 10 to 15 minutes and went away. He did not feel short of breath. He had no nausea or diaphoresis. He had no back pain. No arm pain or neck pain. Heart: Regular rate and rhythm. No murmurs or gallops noted. Lungs: Breath sounds equal bilaterally and clear. Abdomen: Obese, soft, nontender. No masses organomegaly. EKG: Normal sinus rhythm, rate of 71, voltage criteria for LVH, no acute ST-T wave changes. Rhythm strip shows a sinus rhythm with a rate of 71, ME interval 182 ms, QRS of 86 ms with no other ectopy as interpreted by me. No change compared to EKG dated 10/21/2014. Lab reviewed. H&H are normal.  White blood cell count 4400. Electrolytes BUN and creatinine are normal.  Liver enzymes are normal.  Troponin less than 0.01. Lipase of 16. Repeat troponin less than 0.01. Patient's heart score is 3. He has no acute EKG changes. His initial troponin and repeat troponin are unremarkable. His symptoms are short duration. I think he can be worked up further as an outpatient. He understands that he should return if recurrent symptoms. He was provided a referral to cardiology for follow-up.       (Please note that portions of this note were completed with a voice recognition program.  Efforts were made to edit the dictations but occasionally words are mis-transcribed.)    Yoni Pagan MD  Attending Emergency Physician        April Good MD  02/24/20 2056

## 2020-02-25 LAB
EKG ATRIAL RATE: 71 BPM
EKG DIAGNOSIS: NORMAL
EKG P AXIS: 32 DEGREES
EKG P-R INTERVAL: 182 MS
EKG Q-T INTERVAL: 386 MS
EKG QRS DURATION: 86 MS
EKG QTC CALCULATION (BAZETT): 419 MS
EKG R AXIS: -6 DEGREES
EKG T AXIS: -5 DEGREES
EKG VENTRICULAR RATE: 71 BPM

## 2020-02-25 PROCEDURE — 93010 ELECTROCARDIOGRAM REPORT: CPT | Performed by: INTERNAL MEDICINE

## 2020-02-25 NOTE — ED NOTES
D/C: Order noted for d/c. Pt confirmed d/c paperwork has correct name. Discharge and education instructions reviewed with patient. Teach-back successful. Pt verbalized understanding and signed d/c papers. Pt denied questions at this time. No acute distress noted. Patient instructed to follow-up as noted - return to emergency department if symptoms worsen. Patient verbalized understanding. Discharged per EDMD with discharge instructions. Pt discharged to private vehicle. Patient stable upon departure. Thanked patient for choosing [address]  for [facility]. Provider aware of patient pain at time of discharge.        Fadumo Hyatt RN  02/24/20 0736

## 2020-03-13 ENCOUNTER — OFFICE VISIT (OUTPATIENT)
Dept: CARDIOLOGY CLINIC | Age: 50
End: 2020-03-13
Payer: COMMERCIAL

## 2020-03-13 VITALS
SYSTOLIC BLOOD PRESSURE: 110 MMHG | BODY MASS INDEX: 40.23 KG/M2 | HEART RATE: 66 BPM | DIASTOLIC BLOOD PRESSURE: 72 MMHG | HEIGHT: 72 IN | WEIGHT: 297 LBS | OXYGEN SATURATION: 98 %

## 2020-03-13 PROCEDURE — 99214 OFFICE O/P EST MOD 30 MIN: CPT | Performed by: INTERNAL MEDICINE

## 2020-03-13 NOTE — PROGRESS NOTES
[address]  Cardiology Consult Note      Ji Georges  1970, 51 y.o.      CC: \" I had chest pain 1 month ago. \"                 Farzaneh Joseph MD:      HPI:   This is a 51 y.o. male with a documented PMH of hyperlipidemia referred for evaluation of chest pain. He offers having chest pain 1 time with no recurrence. He says the pain started in his abdominal area underneath his left breast area. It radiated up to his chest and was off and on for 15 minutes. This was approx 1 month ago and he has had balaji recurrence. He works in [employer] and the most physical work he does is [employer]. He feels no pain with this. He was informed not to return to the gym until evaluated by cardiology. Past Medical History:   Diagnosis Date    Adenomatous polyp of colon 10/19/2017    Colonoscopy 10/2017 repeat in 5 years.     GERD (gastroesophageal reflux disease)     Hyperlipidemia     Hypertension     Obstructive sleep apnea (adult) (pediatric)       Past Surgical History:   Procedure Laterality Date    ANKLE ARTHROSCOPY      TONSILLECTOMY AND ADENOIDECTOMY        Family History   Problem Relation Age of Onset    Hypertension Mother     Heart Disease Father     Heart Disease Brother     Depression Brother       Social History     Tobacco Use    Smoking status: Former Smoker     Years: 10.00    Smokeless tobacco: Never Used   Substance Use Topics    Alcohol use: Yes     Comment: OCC    Drug use: No     Allergies   Allergen Reactions    Shellfish-Derived Products Hives     Crab, Lobster and Fish          Review of Systems -   Constitutional: Negative for weight gain/loss; malaise, fever  Respiratory: Negative for Asthma;  cough and hemoptysis  Cardiovascular: Negative for palpitations,dizziness   Gastrointestinal: Negative for abd.pain; constipation/diarrhea;    Genitourinary: Negative for stones; hematuria; frequency hesitancy  Integumentt: Negative for rash or pruritis  Hematologic/lymphatic:

## 2024-05-06 ENCOUNTER — OFFICE VISIT (OUTPATIENT)
Dept: ORTHOPEDIC SURGERY | Age: 54
End: 2024-05-06
Payer: COMMERCIAL

## 2024-05-06 VITALS — WEIGHT: 311.8 LBS | BODY MASS INDEX: 42.23 KG/M2 | HEIGHT: 72 IN

## 2024-05-06 DIAGNOSIS — M23.91 INTERNAL DERANGEMENT OF RIGHT KNEE: Primary | ICD-10-CM

## 2024-05-06 DIAGNOSIS — M25.561 RIGHT KNEE PAIN, UNSPECIFIED CHRONICITY: ICD-10-CM

## 2024-05-06 PROCEDURE — 99204 OFFICE O/P NEW MOD 45 MIN: CPT | Performed by: ORTHOPAEDIC SURGERY

## 2024-05-06 PROCEDURE — 20610 DRAIN/INJ JOINT/BURSA W/O US: CPT | Performed by: ORTHOPAEDIC SURGERY

## 2024-05-06 RX ORDER — TRIAMCINOLONE ACETONIDE 40 MG/ML
40 INJECTION, SUSPENSION INTRA-ARTICULAR; INTRAMUSCULAR ONCE
Status: COMPLETED | OUTPATIENT
Start: 2024-05-06 | End: 2024-05-06

## 2024-05-06 RX ORDER — LIDOCAINE HYDROCHLORIDE 10 MG/ML
4 INJECTION, SOLUTION INFILTRATION; PERINEURAL ONCE
Status: COMPLETED | OUTPATIENT
Start: 2024-05-06 | End: 2024-05-06

## 2024-05-06 RX ORDER — MELOXICAM 15 MG/1
15 TABLET ORAL DAILY
Qty: 14 TABLET | Refills: 0 | Status: SHIPPED | OUTPATIENT
Start: 2024-05-06 | End: 2024-05-20

## 2024-05-06 RX ADMIN — TRIAMCINOLONE ACETONIDE 40 MG: 40 INJECTION, SUSPENSION INTRA-ARTICULAR; INTRAMUSCULAR at 14:49

## 2024-05-06 RX ADMIN — LIDOCAINE HYDROCHLORIDE 4 ML: 10 INJECTION, SOLUTION INFILTRATION; PERINEURAL at 14:49

## 2024-05-06 NOTE — PROGRESS NOTES
Patient: Monico Majano  : 1970    MRN: [id number]    Date of Visit: 24    Attending Physician: Ollie Rahman    History of Present Illness  Mr.. Majano is a very pleasant 53 y.o. patient with a several year history of progressive medial knee pain. There is no precipitating event or trauma. The pain is located predominantly in the medial aspect of the knee aggravated by weight bearing. Walking even short distances can be painful. Stair climbing is progressing becoming more difficult and painful. However, it can also awaken the patient at night. He has tried the following interventions without sustained functional improvement:    NSAID's/Tylenol Arthritis strength      PMH/PSH:  Past Medical History:   Diagnosis Date    Adenomatous polyp of colon 10/19/2017    Colonoscopy 10/2017 repeat in 5 years.    GERD (gastroesophageal reflux disease)     Hyperlipidemia     Hypertension     Obstructive sleep apnea (adult) (pediatric)      Patient Active Problem List   Diagnosis    HTN (hypertension)    Left knee pain    Left hip pain    Knee MCL sprain    OA (osteoarthritis) of knee    GERD (gastroesophageal reflux disease)    Hyperlipidemia    Morbid obesity with BMI of 40.0-44.9, adult (HCC)    Arthritis of knee    Sprain of knee    Swelling of lower limb    Obstructive sleep apnea    Left carpal tunnel syndrome    Adenomatous polyp of colon     Past Surgical History:   Procedure Laterality Date    ANKLE ARTHROSCOPY      TONSILLECTOMY AND ADENOIDECTOMY             MEDS:  Scheduled Meds:  Continuous Infusions:  PRN Meds:  Current Meds:  Current Outpatient Medications:     omeprazole (PRILOSEC) 40 MG delayed release capsule, TAKE ONE CAPSULE BY MOUTH DAILY, Disp: 90 capsule, Rfl: 0    amLODIPine (NORVASC) 5 MG tablet, TAKE 1 TABLET BY MOUTH DAILY, Disp: 90 tablet, Rfl: 0    atorvastatin (LIPITOR) 20 MG tablet, TAKE 1 TABLET BY MOUTH EVERY DAY, Disp: 90 tablet, Rfl: 0    losartan-hydrochlorothiazide

## 2024-05-14 NOTE — PLAN OF CARE
socks and shoes.   [] Progressing: [] Met: [] Not Met: [] Adjusted  3. Patient will demonstrate increased Strength of R knee and gluts to at least 4+/5 throughout without pain to allow for improved ease of going up/down stairs and squatting  [] Progressing: [] Met: [] Not Met: [] Adjusted  4. Patient will return to tolerating standing and walking for 30-60 minutes  without increased symptoms or restriction.   [] Progressing: [] Met: [] Not Met: [] Adjusted  5. Pt will be able to tolerate prolonged sitting with <2/10 knee pain in order to  be able  to ride his motorcycle 2-3 hours for recreation  [] Progressing: [] Met: [] Not Met: [] Adjusted     Overall Progression Towards Functional goals/ Treatment Progress Update:  [] Patient is progressing as expected towards functional goals listed.    [] Progression is slowed due to complexities/Impairments listed.  [] Progression has been slowed due to co-morbidities.  [x] Plan just implemented, too soon (<30days) to assess goals progression   [] Goals require adjustment due to lack of progress  [] Patient is not progressing as expected and requires additional follow up with physician  [] Other:     TREATMENT PLAN     Frequency/Duration: 1-2x/week for 6 weeks for the following treatment interventions:    Interventions:  Therapeutic Exercise (69075) including: strength training, ROM, and functional mobility  Therapeutic Activities (28081) including: functional mobility training and education.  Neuromuscular Re-education (10795) activation and proprioception, including postural re-education.    Manual Therapy (32762) as indicated to include: Passive Range of Motion, Gr I-IV mobilizations, and Soft Tissue Mobilization  Patient education on joint protection, activity modification, and progression of HEP    Plan: POC initiated as per evaluation    Electronically Signed by Lennie Murphy PT / DPT        Date: 05/15/2024     Note: Portions of this note have been templated and/or

## 2024-05-15 ENCOUNTER — HOSPITAL ENCOUNTER (OUTPATIENT)
Dept: PHYSICAL THERAPY | Age: 54
Setting detail: THERAPIES SERIES
Discharge: HOME | End: 2024-05-15
Payer: COMMERCIAL

## 2024-05-15 DIAGNOSIS — R29.898 DECREASED STRENGTH OF LOWER EXTREMITY: ICD-10-CM

## 2024-05-15 DIAGNOSIS — G89.29 CHRONIC PAIN OF RIGHT KNEE: Primary | ICD-10-CM

## 2024-05-15 DIAGNOSIS — M25.561 CHRONIC PAIN OF RIGHT KNEE: Primary | ICD-10-CM

## 2024-05-15 DIAGNOSIS — Z74.09 IMPAIRED FUNCTIONAL MOBILITY, BALANCE, GAIT, AND ENDURANCE: ICD-10-CM

## 2024-05-15 DIAGNOSIS — R29.898 IMPAIRED FLEXIBILITY OF LOWER EXTREMITY: ICD-10-CM

## 2024-05-15 PROCEDURE — 97530 THERAPEUTIC ACTIVITIES: CPT

## 2024-05-15 PROCEDURE — 97161 PT EVAL LOW COMPLEX 20 MIN: CPT

## 2024-05-15 PROCEDURE — 97110 THERAPEUTIC EXERCISES: CPT

## 2024-05-21 ENCOUNTER — APPOINTMENT (OUTPATIENT)
Dept: PHYSICAL THERAPY | Age: 54
End: 2024-05-21
Payer: COMMERCIAL

## 2024-05-24 ENCOUNTER — HOSPITAL ENCOUNTER (OUTPATIENT)
Dept: PHYSICAL THERAPY | Age: 54
Setting detail: THERAPIES SERIES
Discharge: HOME | End: 2024-05-24
Payer: COMMERCIAL

## 2024-05-24 PROCEDURE — 97110 THERAPEUTIC EXERCISES: CPT

## 2024-05-24 NOTE — FLOWSHEET NOTE
Progressing: [] Met: [] Not Met: [] Adjusted  5. Pt will be able to tolerate prolonged sitting with <2/10 knee pain in order to  be able  to ride his motorcycle 2-3 hours for recreation  [] Progressing: [] Met: [] Not Met: [] Adjusted     Overall Progression Towards Functional goals/ Treatment Progress Update:  [] Patient is progressing as expected towards functional goals listed.    [] Progression is slowed due to complexities/Impairments listed.  [] Progression has been slowed due to co-morbidities.  [x] Plan just implemented, too soon (<30days) to assess goals progression   [] Goals require adjustment due to lack of progress  [] Patient is not progressing as expected and requires additional follow up with physician  [] Other:     TREATMENT PLAN     Frequency/Duration: 1-2x/week for 6 weeks for the following treatment interventions:    Interventions:  Therapeutic Exercise (55553) including: strength training, ROM, and functional mobility  Therapeutic Activities (65023) including: functional mobility training and education.  Neuromuscular Re-education (54255) activation and proprioception, including postural re-education.    Manual Therapy (76949) as indicated to include: Passive Range of Motion, Gr I-IV mobilizations, and Soft Tissue Mobilization  Patient education on joint protection, activity modification, and progression of HEP    Plan: POC initiated as per evaluation    Electronically Signed by Lu Sánchez / PEARL        Date: 05/24/2024     Note: Portions of this note have been templated and/or copied from initial evaluation, reassessments and prior notes for documentation efficiency.    Note: If patient does not return for scheduled/recommended follow up visits, this note will serve as a discharge from care along with the most recent update on progress.

## 2024-05-29 ENCOUNTER — HOSPITAL ENCOUNTER (OUTPATIENT)
Dept: PHYSICAL THERAPY | Age: 54
Setting detail: THERAPIES SERIES
Discharge: HOME | End: 2024-05-29
Payer: COMMERCIAL

## 2024-05-29 PROCEDURE — 97530 THERAPEUTIC ACTIVITIES: CPT

## 2024-05-29 PROCEDURE — 97110 THERAPEUTIC EXERCISES: CPT

## 2024-05-29 PROCEDURE — 97112 NEUROMUSCULAR REEDUCATION: CPT

## 2024-05-29 NOTE — FLOWSHEET NOTE
[facility] - Outpatient Rehabilitation and Therapy [address], [place] [address] office: [phone] fax: [phone]         Physical Therapy: TREATMENT/PROGRESS NOTE   Patient: Monico Majano (53 y.o. male)   Examination Date: 2024   :  1970 MRN: [id number]   Visit #: 3 /12  Insurance Allowable Auth Needed   [place] []Yes    []No    Insurance: Payor: UMR / Plan: UMR / Product Type: *No Product type* /   Insurance ID: [id number] - (Commercial)  Secondary Insurance (if applicable):    Treatment Diagnosis:      ICD-10-CM    1. Chronic pain of right knee  M25.561     G89.29       2. Impaired flexibility of lower extremity  R29.898       3. Decreased strength of lower extremity  R29.898       4. Impaired functional mobility, balance, gait, and endurance  Z74.09          Medical Diagnosis:  Right knee pain, unspecified chronicity [M25.561]   Referring Physician: Ollie Rahman MD  PCP: Radha Barrios MD     Plan of care signed (Y/N):     Date of Patient follow up with Physician:      Progress Report/POC: NO  POC update due: (10 visits /OR AUTH LIMITS, whichever is less)   2024                                             Precautions/ Contra-indications:           Latex allergy:  NO  Pacemaker:    NO  Contraindications for Manipulation: None  Date of Surgery: n/a  Other:    Red Flags:  None    C-SSRS Triggered by Intake questionnaire:   Patient answered 'NO' to both behavioral questions on intake.  No further screening warranted    Preferred Language for Healthcare:   [x] English       [] other:    SUBJECTIVE EXAMINATION     Patient stated complaint: :  Pt reports slight increase in soreness today; states he has been short handed at work so has had to do a little more on his own.  States home exercises are going well and he feels he is progressing well.  Pt states he has been sleeping much better.          Test used Initial score  2024   Pain Summary VAS 4-5/10

## 2024-05-31 ENCOUNTER — HOSPITAL ENCOUNTER (OUTPATIENT)
Dept: PHYSICAL THERAPY | Age: 54
Setting detail: THERAPIES SERIES
End: 2024-05-31
Payer: COMMERCIAL

## 2024-05-31 ENCOUNTER — HOSPITAL ENCOUNTER (OUTPATIENT)
Dept: PHYSICAL THERAPY | Age: 54
Setting detail: THERAPIES SERIES
Discharge: HOME | End: 2024-05-31
Payer: COMMERCIAL

## 2024-05-31 PROCEDURE — 97112 NEUROMUSCULAR REEDUCATION: CPT

## 2024-05-31 PROCEDURE — 97110 THERAPEUTIC EXERCISES: CPT

## 2024-05-31 PROCEDURE — 97530 THERAPEUTIC ACTIVITIES: CPT

## 2024-05-31 NOTE — FLOWSHEET NOTE
inf around patella with 25% pull for proper tracking; I strip (decompression strip) across patellar tendon with 75% pull.    X 5 min       Modalities:    5/29:  No modalities applied this session    Education: 5/29:  Pt educated on use of K-tape; mechanism by which it works, proper wear time and removal.   5/15/24: Pt educated on PT diagnosis, prognosis, rehab potential, POC and expectations for rehab   -educated pt on Bakers cyst and managing with CP  -importance of improving flexibility    Home Exercise Program:   5/31:  Access Code: [email or web address]  URL: [email or web address]  Date: 05/31/2024  Prepared by: Elham Panda T-band issued.  Exercises  - Hip Abduction with Resistance Loop  - 1 x daily - 7 x weekly - 3 sets - 10 reps  - Sidelying Hip Abduction  - 1 x daily - 7 x weekly - 3 sets - 10 reps  - Hip Extension with Resistance Loop  - 1 x daily - 7 x weekly - 3 sets - 10 reps  - Prone Hip Extension  - 1 x daily - 7 x weekly - 3 sets - 10 reps    5/29:  Pt reports doing well with HEP; no new additions today.     Patient HEP program created electronically.  Refer to [employer] access code:    Access Code: [email or web address]  URL: [email or web address]  Date: 05/15/2024  Prepared by: Lennie Muprhy    Exercises  - Standing Quad Stretch with Towel and Arm Support  - 1 x daily - 7 x weekly - 2-3 reps - 20-30 second hold  - Standing Hamstring Stretch with Step (Mirrored)  - 1 x daily - 7 x weekly - 2-3 reps - 20-30 second hold  - Standing hip flexor stretch on step or chair  - 1 x daily - 7 x weekly - 2-3 reps - 20-30 second hold      ASSESSMENT     Today's Assessment:  Pt experienced pain during some of the exercises however the pain did not persist after exercise complete.  No overall change in pain post session.  Added hip strengthening to HEP and held quad stretch as the knee flexion appears to be aggravating a possible bakers cyst.      Medical Necessity Documentation:  I certify that this patient meets the

## 2024-06-04 ENCOUNTER — HOSPITAL ENCOUNTER (OUTPATIENT)
Dept: PHYSICAL THERAPY | Age: 54
Setting detail: THERAPIES SERIES
Discharge: HOME | End: 2024-06-04
Payer: COMMERCIAL

## 2024-06-04 PROCEDURE — 97110 THERAPEUTIC EXERCISES: CPT

## 2024-06-04 PROCEDURE — 97112 NEUROMUSCULAR REEDUCATION: CPT

## 2024-06-04 PROCEDURE — 97530 THERAPEUTIC ACTIVITIES: CPT

## 2024-06-04 NOTE — FLOWSHEET NOTE
goals/ Treatment Progress Update:  [] Patient is progressing as expected towards functional goals listed.    [] Progression is slowed due to complexities/Impairments listed.  [] Progression has been slowed due to co-morbidities.  [x] Plan just implemented, too soon (<30days) to assess goals progression   [] Goals require adjustment due to lack of progress  [] Patient is not progressing as expected and requires additional follow up with physician  [] Other:     TREATMENT PLAN     Frequency/Duration: 1-2x/week for 6 weeks for the following treatment interventions:    Interventions:  Therapeutic Exercise (05228) including: strength training, ROM, and functional mobility  Therapeutic Activities (79056) including: functional mobility training and education.  Neuromuscular Re-education (57588) activation and proprioception, including postural re-education.    Manual Therapy (70556) as indicated to include: Passive Range of Motion, Gr I-IV mobilizations, and Soft Tissue Mobilization  Patient education on joint protection, activity modification, and progression of HEP    Plan: Cont POC- Continue emphasis/focus on exercise progression, improving proper muscle recruitment and activation/motor control patterns, modulating pain, static and dynamic balance, and kinesthetic sense and proprioception. Next visit plan to progress weights, progress reps, add new exercises, and progress balance     Electronically Signed by Lu Sánchez ,PTA [id number]     Date: 06/04/2024     Note: Portions of this note have been templated and/or copied from initial evaluation, reassessments and prior notes for documentation efficiency.    Note: If patient does not return for scheduled/recommended follow up visits, this note will serve as a discharge from care along with the most recent update on progress.

## 2024-06-07 ENCOUNTER — HOSPITAL ENCOUNTER (OUTPATIENT)
Dept: PHYSICAL THERAPY | Age: 54
Setting detail: THERAPIES SERIES
Discharge: HOME | End: 2024-06-07
Payer: COMMERCIAL

## 2024-06-07 PROCEDURE — 97110 THERAPEUTIC EXERCISES: CPT

## 2024-06-07 PROCEDURE — 97530 THERAPEUTIC ACTIVITIES: CPT

## 2024-06-07 NOTE — FLOWSHEET NOTE
goals progression   [] Goals require adjustment due to lack of progress  [] Patient is not progressing as expected and requires additional follow up with physician  [] Other:     TREATMENT PLAN     Frequency/Duration: 1-2x/week for 6 weeks for the following treatment interventions:    Interventions:  Therapeutic Exercise (22044) including: strength training, ROM, and functional mobility  Therapeutic Activities (09899) including: functional mobility training and education.  Neuromuscular Re-education (83709) activation and proprioception, including postural re-education.    Manual Therapy (65475) as indicated to include: Passive Range of Motion, Gr I-IV mobilizations, and Soft Tissue Mobilization  Patient education on joint protection, activity modification, and progression of HEP    Plan: Cont POC- Continue emphasis/focus on exercise progression, improving proper muscle recruitment and activation/motor control patterns, modulating pain, static and dynamic balance, and kinesthetic sense and proprioception. Next visit plan to progress weights, progress reps, add new exercises, and progress balance     Electronically Signed by Elham Cotter PT , DPT    Date: 06/07/2024     Note: Portions of this note have been templated and/or copied from initial evaluation, reassessments and prior notes for documentation efficiency.    Note: If patient does not return for scheduled/recommended follow up visits, this note will serve as a discharge from care along with the most recent update on progress.

## 2024-06-11 ENCOUNTER — HOSPITAL ENCOUNTER (OUTPATIENT)
Dept: PHYSICAL THERAPY | Age: 54
Setting detail: THERAPIES SERIES
Discharge: HOME | End: 2024-06-11
Payer: COMMERCIAL

## 2024-06-11 PROCEDURE — 97530 THERAPEUTIC ACTIVITIES: CPT

## 2024-06-11 PROCEDURE — 97110 THERAPEUTIC EXERCISES: CPT

## 2024-06-11 NOTE — FLOWSHEET NOTE
R patella this date prior to starting exercise routine.  Increased step height and sets this date for improved quad strength.  Pt challenged with progressions with quad muscle fatigue noted but no report of increased patellar pain.  Continue to monitor tolerance to progressions and monitor patellar discomfort with upgrades to routine for continued strength, endurance and patellar stability without pain.     Medical Necessity Documentation:  I certify that this patient meets the below criteria necessary for medical necessity for care and/or justification of therapy services:  The patient has functional impairments and/or activity limitations and would benefit from continued outpatient therapy services to address the deficits outlined in the patients goals    Return to Play: NA    Prognosis for POC: [x] Good [] Fair  [] Poor    Patient requires continued skilled intervention: [x] Yes  [] No      CHARGE CAPTURE     PT CHARGE GRID   CPT Code (TIMED) minutes # CPT Code (UNTIMED) #     Therex (26618)  20 1  EVAL:LOW (25329 - Typically 20 minutes face-to-face)     Neuromusc. Re-ed (83840)    Re-Eval (78850)     Manual (12634)    Estim Unattended (53917)     Ther. Act (53543) 25 2  Mech. Traction (04408)     Gait (11005)    Dry Needle 1-2 muscle (44029)     Aquatic Therex (78326)    Dry Needle 3+ muscle (20561)     Iontophoresis (67978)    VASO (79435)     Ultrasound (13527)    Group Therapy (19050)     Estim Attended (93510)    Canalith Repositioning (28328)     Other:    Other:    Total Timed Code Tx Minutes 45 3       Total Treatment Minutes 45        Charge Justification:  (73866) THERAPEUTIC EXERCISE - Provided verbal/tactile cueing for activities related to strengthening, flexibility, endurance, ROM performed to prevent loss of range of motion, maintain or improve muscular strength or increase flexibility, following either an injury or surgery.   (49563) THERAPEUTIC ACTIVITY - use of dynamic activities to improve

## 2024-06-14 ENCOUNTER — APPOINTMENT (OUTPATIENT)
Dept: PHYSICAL THERAPY | Age: 54
End: 2024-06-14
Payer: COMMERCIAL

## 2024-07-08 ENCOUNTER — TELEPHONE (OUTPATIENT)
Dept: ORTHOPEDIC SURGERY | Age: 54
End: 2024-07-08

## 2024-07-08 ENCOUNTER — OFFICE VISIT (OUTPATIENT)
Dept: ORTHOPEDIC SURGERY | Age: 54
End: 2024-07-08
Payer: COMMERCIAL

## 2024-07-08 DIAGNOSIS — M23.91 INTERNAL DERANGEMENT OF RIGHT KNEE: Primary | ICD-10-CM

## 2024-07-08 PROCEDURE — 99213 OFFICE O/P EST LOW 20 MIN: CPT | Performed by: PHYSICIAN ASSISTANT

## 2024-07-08 NOTE — PROGRESS NOTES
in HPI.    Physical Examination:  Patient is alert and oriented x 3 and appears well nourished and appropriate for today's visit.  Height:   Ht Readings from Last 3 Encounters:   05/06/24 1.829 m (6')   03/13/20 1.829 m (6')   02/24/20 1.829 m (6')     Weight:   Wt Readings from Last 3 Encounters:   05/06/24 (!) 141.4 kg (311 lb 12.8 oz)   03/13/20 134.7 kg (297 lb)   02/24/20 (!) 139.7 kg (307 lb 15.7 oz)     Gait: The patient walks with antalgic gait.  Right Knee: ++ effusion, - ITB             Ligaments stable to varus/valgus stress at full extension and 30 degrees.              AROM Right: 0-120 deg               + patellofemoral crepitus             + medial joint line tenderness    +Adri     Radiographs: Standing AP/PA/lateral/Sunrise RIGHT Knee: Imaging was reviewed with the patient. There are mild degenerative changes of the RIGHT  knee. There is no radiographic evidence of AVN, fracture, or dislocation.     Non-Operative Treatment      Assessment and Plan?: The patient has continued medial joint line pain.     Minimal relief with corticosteroid injection and physical therapy.     We discussed the diagnosis and treatment options in detail with the patient.  I recommended that we proceed with an MRI of his right knee to evaluate for meniscus pathology vs other internal derangement.   He will follow-up after his MRI is complete.     ____   Will HOLA Busby  ?   ??cc: Radha Barrios MD

## 2025-06-20 ENCOUNTER — HOSPITAL ENCOUNTER (INPATIENT)
Age: 55
LOS: 3 days | Discharge: HOME | DRG: 641 | End: 2025-06-23
Attending: INTERNAL MEDICINE | Admitting: INTERNAL MEDICINE
Payer: COMMERCIAL

## 2025-06-20 ENCOUNTER — APPOINTMENT (OUTPATIENT)
Dept: CT IMAGING | Age: 55
DRG: 641 | End: 2025-06-20
Payer: COMMERCIAL

## 2025-06-20 DIAGNOSIS — R07.9 CHEST PAIN, UNSPECIFIED TYPE: ICD-10-CM

## 2025-06-20 DIAGNOSIS — R42 LIGHTHEADEDNESS: Primary | ICD-10-CM

## 2025-06-20 DIAGNOSIS — R79.89 ELEVATED TROPONIN: ICD-10-CM

## 2025-06-20 DIAGNOSIS — E87.6 HYPOKALEMIA: ICD-10-CM

## 2025-06-20 DIAGNOSIS — E83.42 HYPOMAGNESEMIA: ICD-10-CM

## 2025-06-20 PROBLEM — I21.4 NSTEMI (NON-ST ELEVATED MYOCARDIAL INFARCTION) (HCC): Status: ACTIVE | Noted: 2025-06-20

## 2025-06-20 LAB
ALBUMIN SERPL-MCNC: 4 G/DL (ref 3.4–5)
ALBUMIN/GLOB SERPL: 1.4 {RATIO} (ref 1.1–2.2)
ALP SERPL-CCNC: 73 U/L (ref 40–129)
ALT SERPL-CCNC: 22 U/L (ref 10–40)
ANION GAP SERPL CALCULATED.3IONS-SCNC: 12 MMOL/L (ref 3–16)
AST SERPL-CCNC: 22 U/L (ref 15–37)
BASOPHILS # BLD: 0 K/UL (ref 0–0.2)
BASOPHILS NFR BLD: 0.8 %
BILIRUB SERPL-MCNC: 1.3 MG/DL (ref 0–1)
BILIRUB UR QL STRIP.AUTO: NEGATIVE
BUN SERPL-MCNC: 13 MG/DL (ref 7–20)
CALCIUM SERPL-MCNC: 9.7 MG/DL (ref 8.3–10.6)
CHLORIDE SERPL-SCNC: 104 MMOL/L (ref 99–110)
CLARITY UR: CLEAR
CO2 SERPL-SCNC: 25 MMOL/L (ref 21–32)
COLOR UR: YELLOW
CREAT SERPL-MCNC: 1.1 MG/DL (ref 0.9–1.3)
DEPRECATED RDW RBC AUTO: 13.1 % (ref 12.4–15.4)
EKG ATRIAL RATE: 76 BPM
EKG DIAGNOSIS: NORMAL
EKG P AXIS: 51 DEGREES
EKG P-R INTERVAL: 172 MS
EKG Q-T INTERVAL: 396 MS
EKG QRS DURATION: 88 MS
EKG QTC CALCULATION (BAZETT): 445 MS
EKG R AXIS: -3 DEGREES
EKG T AXIS: 7 DEGREES
EKG VENTRICULAR RATE: 76 BPM
EOSINOPHIL # BLD: 0.1 K/UL (ref 0–0.6)
EOSINOPHIL NFR BLD: 4.1 %
GFR SERPLBLD CREATININE-BSD FMLA CKD-EPI: 80 ML/MIN/{1.73_M2}
GLUCOSE SERPL-MCNC: 114 MG/DL (ref 70–99)
GLUCOSE UR STRIP.AUTO-MCNC: NEGATIVE MG/DL
HCT VFR BLD AUTO: 41.3 % (ref 40.5–52.5)
HGB BLD-MCNC: 14.1 G/DL (ref 13.5–17.5)
HGB UR QL STRIP.AUTO: NEGATIVE
KETONES UR STRIP.AUTO-MCNC: NEGATIVE MG/DL
LEUKOCYTE ESTERASE UR QL STRIP.AUTO: NEGATIVE
LYMPHOCYTES # BLD: 1.2 K/UL (ref 1–5.1)
LYMPHOCYTES NFR BLD: 33.6 %
MAGNESIUM SERPL-MCNC: 1.77 MG/DL (ref 1.8–2.4)
MCH RBC QN AUTO: 30.3 PG (ref 26–34)
MCHC RBC AUTO-ENTMCNC: 34.1 G/DL (ref 31–36)
MCV RBC AUTO: 89 FL (ref 80–100)
MONOCYTES # BLD: 0.2 K/UL (ref 0–1.3)
MONOCYTES NFR BLD: 5.8 %
NEUTROPHILS # BLD: 2 K/UL (ref 1.7–7.7)
NEUTROPHILS NFR BLD: 55.7 %
NITRITE UR QL STRIP.AUTO: NEGATIVE
NT-PROBNP SERPL-MCNC: 54 PG/ML (ref 0–124)
PH UR STRIP.AUTO: 6.5 [PH] (ref 5–8)
PLATELET # BLD AUTO: 232 K/UL (ref 135–450)
PMV BLD AUTO: 7.8 FL (ref 5–10.5)
POTASSIUM SERPL-SCNC: 3.4 MMOL/L (ref 3.5–5.1)
PROT SERPL-MCNC: 6.9 G/DL (ref 6.4–8.2)
PROT UR STRIP.AUTO-MCNC: NEGATIVE MG/DL
RBC # BLD AUTO: 4.64 M/UL (ref 4.2–5.9)
SODIUM SERPL-SCNC: 141 MMOL/L (ref 136–145)
SP GR UR STRIP.AUTO: >=1.03 (ref 1–1.03)
TROPONIN, HIGH SENSITIVITY: 13 NG/L (ref 0–22)
TROPONIN, HIGH SENSITIVITY: 15 NG/L (ref 0–22)
TROPONIN, HIGH SENSITIVITY: 23 NG/L (ref 0–22)
TSH SERPL DL<=0.005 MIU/L-ACNC: 0.86 UIU/ML (ref 0.27–4.2)
UA COMPLETE W REFLEX CULTURE PNL UR: NORMAL
UA DIPSTICK W REFLEX MICRO PNL UR: NORMAL
URN SPEC COLLECT METH UR: NORMAL
UROBILINOGEN UR STRIP-ACNC: 1 E.U./DL
WBC # BLD AUTO: 3.6 K/UL (ref 4–11)

## 2025-06-20 PROCEDURE — 94760 N-INVAS EAR/PLS OXIMETRY 1: CPT

## 2025-06-20 PROCEDURE — 2500000003 HC RX 250 WO HCPCS: Performed by: INTERNAL MEDICINE

## 2025-06-20 PROCEDURE — 83735 ASSAY OF MAGNESIUM: CPT

## 2025-06-20 PROCEDURE — 85025 COMPLETE CBC W/AUTO DIFF WBC: CPT

## 2025-06-20 PROCEDURE — 6370000000 HC RX 637 (ALT 250 FOR IP): Performed by: PHYSICIAN ASSISTANT

## 2025-06-20 PROCEDURE — 1200000000 HC SEMI PRIVATE

## 2025-06-20 PROCEDURE — 93010 ELECTROCARDIOGRAM REPORT: CPT | Performed by: INTERNAL MEDICINE

## 2025-06-20 PROCEDURE — 83880 ASSAY OF NATRIURETIC PEPTIDE: CPT

## 2025-06-20 PROCEDURE — 84443 ASSAY THYROID STIM HORMONE: CPT

## 2025-06-20 PROCEDURE — 6360000004 HC RX CONTRAST MEDICATION: Performed by: PHYSICIAN ASSISTANT

## 2025-06-20 PROCEDURE — 71260 CT THORAX DX C+: CPT

## 2025-06-20 PROCEDURE — 81003 URINALYSIS AUTO W/O SCOPE: CPT

## 2025-06-20 PROCEDURE — 2580000003 HC RX 258: Performed by: INTERNAL MEDICINE

## 2025-06-20 PROCEDURE — 80053 COMPREHEN METABOLIC PANEL: CPT

## 2025-06-20 PROCEDURE — 93005 ELECTROCARDIOGRAM TRACING: CPT | Performed by: INTERNAL MEDICINE

## 2025-06-20 PROCEDURE — 99285 EMERGENCY DEPT VISIT HI MDM: CPT

## 2025-06-20 PROCEDURE — 0202U NFCT DS 22 TRGT SARS-COV-2: CPT

## 2025-06-20 PROCEDURE — 36415 COLL VENOUS BLD VENIPUNCTURE: CPT

## 2025-06-20 PROCEDURE — 84484 ASSAY OF TROPONIN QUANT: CPT

## 2025-06-20 RX ORDER — IOPAMIDOL 755 MG/ML
75 INJECTION, SOLUTION INTRAVASCULAR
Status: COMPLETED | OUTPATIENT
Start: 2025-06-20 | End: 2025-06-20

## 2025-06-20 RX ORDER — ASPIRIN 81 MG/1
324 TABLET, CHEWABLE ORAL ONCE
Status: COMPLETED | OUTPATIENT
Start: 2025-06-20 | End: 2025-06-20

## 2025-06-20 RX ORDER — ACETAMINOPHEN 325 MG/1
650 TABLET ORAL EVERY 6 HOURS PRN
Status: DISCONTINUED | OUTPATIENT
Start: 2025-06-20 | End: 2025-06-23 | Stop reason: HOSPADM

## 2025-06-20 RX ORDER — MAGNESIUM SULFATE IN WATER 40 MG/ML
2000 INJECTION, SOLUTION INTRAVENOUS PRN
Status: DISCONTINUED | OUTPATIENT
Start: 2025-06-20 | End: 2025-06-23 | Stop reason: HOSPADM

## 2025-06-20 RX ORDER — SODIUM CHLORIDE 9 MG/ML
INJECTION, SOLUTION INTRAVENOUS CONTINUOUS
Status: ACTIVE | OUTPATIENT
Start: 2025-06-20 | End: 2025-06-21

## 2025-06-20 RX ORDER — SODIUM CHLORIDE 9 MG/ML
INJECTION, SOLUTION INTRAVENOUS PRN
Status: DISCONTINUED | OUTPATIENT
Start: 2025-06-20 | End: 2025-06-23 | Stop reason: HOSPADM

## 2025-06-20 RX ORDER — SODIUM CHLORIDE 0.9 % (FLUSH) 0.9 %
5-40 SYRINGE (ML) INJECTION PRN
Status: DISCONTINUED | OUTPATIENT
Start: 2025-06-20 | End: 2025-06-23 | Stop reason: HOSPADM

## 2025-06-20 RX ORDER — POTASSIUM CHLORIDE 1500 MG/1
40 TABLET, EXTENDED RELEASE ORAL PRN
Status: DISCONTINUED | OUTPATIENT
Start: 2025-06-20 | End: 2025-06-23 | Stop reason: HOSPADM

## 2025-06-20 RX ORDER — ONDANSETRON 4 MG/1
4 TABLET, ORALLY DISINTEGRATING ORAL EVERY 8 HOURS PRN
Status: DISCONTINUED | OUTPATIENT
Start: 2025-06-20 | End: 2025-06-23 | Stop reason: HOSPADM

## 2025-06-20 RX ORDER — POTASSIUM CHLORIDE 7.45 MG/ML
10 INJECTION INTRAVENOUS PRN
Status: DISCONTINUED | OUTPATIENT
Start: 2025-06-20 | End: 2025-06-23 | Stop reason: HOSPADM

## 2025-06-20 RX ORDER — SODIUM CHLORIDE 0.9 % (FLUSH) 0.9 %
5-40 SYRINGE (ML) INJECTION EVERY 12 HOURS SCHEDULED
Status: DISCONTINUED | OUTPATIENT
Start: 2025-06-20 | End: 2025-06-23 | Stop reason: HOSPADM

## 2025-06-20 RX ORDER — ONDANSETRON 2 MG/ML
4 INJECTION INTRAMUSCULAR; INTRAVENOUS EVERY 6 HOURS PRN
Status: DISCONTINUED | OUTPATIENT
Start: 2025-06-20 | End: 2025-06-23 | Stop reason: HOSPADM

## 2025-06-20 RX ORDER — ACETAMINOPHEN 650 MG/1
650 SUPPOSITORY RECTAL EVERY 6 HOURS PRN
Status: DISCONTINUED | OUTPATIENT
Start: 2025-06-20 | End: 2025-06-23 | Stop reason: HOSPADM

## 2025-06-20 RX ORDER — ASPIRIN 81 MG/1
81 TABLET, CHEWABLE ORAL DAILY
Status: DISCONTINUED | OUTPATIENT
Start: 2025-06-21 | End: 2025-06-23 | Stop reason: HOSPADM

## 2025-06-20 RX ORDER — POLYETHYLENE GLYCOL 3350 17 G/17G
17 POWDER, FOR SOLUTION ORAL DAILY PRN
Status: DISCONTINUED | OUTPATIENT
Start: 2025-06-20 | End: 2025-06-23 | Stop reason: HOSPADM

## 2025-06-20 RX ORDER — ATORVASTATIN CALCIUM 20 MG/1
20 TABLET, FILM COATED ORAL DAILY
Status: DISCONTINUED | OUTPATIENT
Start: 2025-06-21 | End: 2025-06-23 | Stop reason: HOSPADM

## 2025-06-20 RX ORDER — LANOLIN ALCOHOL/MO/W.PET/CERES
400 CREAM (GRAM) TOPICAL ONCE
Status: COMPLETED | OUTPATIENT
Start: 2025-06-20 | End: 2025-06-20

## 2025-06-20 RX ADMIN — Medication 10 ML: at 21:36

## 2025-06-20 RX ADMIN — Medication 400 MG: at 14:33

## 2025-06-20 RX ADMIN — IOPAMIDOL 75 ML: 755 INJECTION, SOLUTION INTRAVENOUS at 13:33

## 2025-06-20 RX ADMIN — POTASSIUM BICARBONATE 20 MEQ: 782 TABLET, EFFERVESCENT ORAL at 14:33

## 2025-06-20 RX ADMIN — SODIUM CHLORIDE: 0.9 INJECTION, SOLUTION INTRAVENOUS at 21:35

## 2025-06-20 RX ADMIN — ASPIRIN 324 MG: 81 TABLET, CHEWABLE ORAL at 15:41

## 2025-06-20 ASSESSMENT — PAIN DESCRIPTION - LOCATION: LOCATION: CHEST

## 2025-06-20 ASSESSMENT — LIFESTYLE VARIABLES
HOW MANY STANDARD DRINKS CONTAINING ALCOHOL DO YOU HAVE ON A TYPICAL DAY: PATIENT DOES NOT DRINK
HOW OFTEN DO YOU HAVE A DRINK CONTAINING ALCOHOL: NEVER

## 2025-06-20 ASSESSMENT — PAIN DESCRIPTION - DESCRIPTORS: DESCRIPTORS: SHARP

## 2025-06-20 ASSESSMENT — PAIN SCALES - GENERAL: PAINLEVEL_OUTOF10: 3

## 2025-06-20 ASSESSMENT — PAIN - FUNCTIONAL ASSESSMENT: PAIN_FUNCTIONAL_ASSESSMENT: 0-10

## 2025-06-20 ASSESSMENT — HEART SCORE: ECG: NORMAL

## 2025-06-20 NOTE — ACP (ADVANCE CARE PLANNING)
Advanced Care Planning Note.    Purpose of Encounter: Advanced care planning in light of NSTEMI  Parties In Attendance: Patient  Decisional Capacity: Yes  Subjective: Patient is dizzy  Objective: Cr 1.1  Goals of Care Determination: Patient/ wants full support (CPR, vent, surgery, HD, trach, PEG)  Plan:  IVF, Lovenox bid, Echo, Cardio consult  Code Status: Full code   Time spent on Advanced care Plannin minutes  Advanced Care Planning Documents: Completed advanced directives on chart, mother is the POA.    Raf Lepe MD  2025 4:45 PM

## 2025-06-20 NOTE — H&P
HOSPITALISTS HISTORY AND PHYSICAL    6/20/2025 4:44 PM    Patient Information:  MONICO MAJANO is a 54 y.o. male [id number]  PCP:  Radha Barrios MD (Tel: [phone] )    Chief complaint:    Chief Complaint   Patient presents with    Lightheadedness     Patient arrives through triage with complaints of lightheadedness and palpitations. Reports driving to get his gets when he suddenly felt lightheaded and his heart started racing. Denies SOB. Denies nausea.         History of Present Illness:  Monico Majano is a 54 y.o. male with history of HTN, morbid obesity came to ER with acute onset of dizziness while driving.  Patient was going to  his child and came to ER because he felt very lightheaded.  No CP, but felt palpitations.  No syncope.  No nausea, vomiting, diarrhea, melena, hematochezia.  No fevers, chills, NS, dysphagia, abdominal pain, weakness, numbness or tingling.  Older brother has had multiple heart attacks.  No new LE edema or orthopnea.  Otherwise complete ROS is negative unless listed above.      REVIEW OF SYSTEMS:   Pertinent positives as noted in HPI.  All other systems were reviewed and are negative.      Past Medical History:   has a past medical history of Adenomatous polyp of colon, GERD (gastroesophageal reflux disease), Hyperlipidemia, Hypertension, and Obstructive sleep apnea (adult) (pediatric).     Past Surgical History:   has a past surgical history that includes Ankle arthroscopy and Tonsillectomy and adenoidectomy.     Medications:  No current facility-administered medications on file prior to encounter.     Current Outpatient Medications on File Prior to Encounter   Medication Sig Dispense Refill    meloxicam (MOBIC) 15 MG tablet Take 1 tablet by mouth daily for 14 days 14 tablet 0    omeprazole (PRILOSEC) 40 MG delayed release capsule TAKE ONE CAPSULE BY MOUTH DAILY 90 capsule

## 2025-06-20 NOTE — ED NOTES
Patient transported by patient transport to unit in stable condition on bedside monitor at this time with all patient belongings.

## 2025-06-20 NOTE — ED PROVIDER NOTES
I did not perform history or physical on Monico Majano.  This patient was seen independently by an CECIL. I did review the EKG, which is documented below.     EKG  The Ekg interpreted by me in the absence of a cardiologist shows.  normal sinus rhythm with a rate of 76  Axis is   Normal  QTc is  normal  Intervals and Durations are unremarkable.      No specific ST-T wave changes appreciated.  No evidence of acute ischemia.   No significant change from prior EKG dated 2/24/2020    Electronically signed by:        Lex Rodgers DO  06/20/25 1538

## 2025-06-20 NOTE — ED NOTES
Patient states has been having some episodes of dizziness on and off but attributed the dizziness to his bp meds, denies any cardiac hx.

## 2025-06-20 NOTE — ED PROVIDER NOTES
[facility] EMERGENCY DEPARTMENT  EMERGENCY DEPARTMENT ENCOUNTER        Pt Name: Monico Majano  MRN: [id number]  Birthdate 1970  Date of evaluation: 6/20/2025  Provider: CONOR Levi  PCP: Radha Barrios MD  Note Started: 12:53 PM EDT 6/20/25      CECIL. I have evaluated this patient.        CHIEF COMPLAINT       Chief Complaint   Patient presents with    Lightheadedness     Patient arrives through triage with complaints of lightheadedness and palpitations. Reports driving to get his gets when he suddenly felt lightheaded and his heart started racing. Denies SOB. Denies nausea.        HISTORY OF PRESENT ILLNESS: 1 or more Elements     History From: Patient  Limitations to history : None    Monico Majano is a 54 y.o. male with past medical history of hypertension, hyperlipidemia, obstructive sleep apnea, GERD who presents ED with complaint of lightheadedness and palpitations.  Reports he was driving to go get his kids when he states he felt lightheaded like he was going to pass out.  But like his heart was racing.  No chest pain or chest tightness.  Did feel short of breath.  No abdominal pain, nausea or vomiting.  States did feel a little sweaty but states he was driving in his car with the windows down and it is hot outside.  No actual syncopal episode.  No dizziness or room spinning sensation.  Reports he feels better since he got to the emergency department but still having feelings of lightheadedness.  He denies history of similar symptoms in the past.  Reports does have family history of heart disease.  Reports older brother has had multiple heart attacks.  He reports he has had stress test in the past but reports numerous years ago.  No recent cardiac workup.  He denies history of DVT or PE.  Denies orthopnea, pedal edema or calf tenderness.  Denies pleuritic discomfort, cough or hemoptysis.  Reports recent travel plane to [place] about a month and a half ago.  Denies any other

## 2025-06-21 LAB
ANION GAP SERPL CALCULATED.3IONS-SCNC: 11 MMOL/L (ref 3–16)
BASOPHILS # BLD: 0 K/UL (ref 0–0.2)
BASOPHILS NFR BLD: 0.9 %
BUN SERPL-MCNC: 13 MG/DL (ref 7–20)
CALCIUM SERPL-MCNC: 8.8 MG/DL (ref 8.3–10.6)
CHLORIDE SERPL-SCNC: 105 MMOL/L (ref 99–110)
CHOLEST SERPL-MCNC: 145 MG/DL (ref 0–199)
CO2 SERPL-SCNC: 24 MMOL/L (ref 21–32)
CREAT SERPL-MCNC: 0.9 MG/DL (ref 0.9–1.3)
DEPRECATED RDW RBC AUTO: 13.3 % (ref 12.4–15.4)
EOSINOPHIL # BLD: 0.2 K/UL (ref 0–0.6)
EOSINOPHIL NFR BLD: 4.1 %
EST. AVERAGE GLUCOSE BLD GHB EST-MCNC: 119.8 MG/DL
GFR SERPLBLD CREATININE-BSD FMLA CKD-EPI: >90 ML/MIN/{1.73_M2}
GLUCOSE SERPL-MCNC: 119 MG/DL (ref 70–99)
HBA1C MFR BLD: 5.8 %
HCT VFR BLD AUTO: 38.2 % (ref 40.5–52.5)
HDLC SERPL-MCNC: 42 MG/DL (ref 40–60)
HGB BLD-MCNC: 13.2 G/DL (ref 13.5–17.5)
LDLC SERPL CALC-MCNC: 71 MG/DL
LYMPHOCYTES # BLD: 1.6 K/UL (ref 1–5.1)
LYMPHOCYTES NFR BLD: 38.7 %
MAGNESIUM SERPL-MCNC: 1.85 MG/DL (ref 1.8–2.4)
MCH RBC QN AUTO: 30.3 PG (ref 26–34)
MCHC RBC AUTO-ENTMCNC: 34.6 G/DL (ref 31–36)
MCV RBC AUTO: 87.6 FL (ref 80–100)
MONOCYTES # BLD: 0.3 K/UL (ref 0–1.3)
MONOCYTES NFR BLD: 7.7 %
NEUTROPHILS # BLD: 2 K/UL (ref 1.7–7.7)
NEUTROPHILS NFR BLD: 48.6 %
PLATELET # BLD AUTO: 230 K/UL (ref 135–450)
PMV BLD AUTO: 8.1 FL (ref 5–10.5)
POTASSIUM SERPL-SCNC: 3.1 MMOL/L (ref 3.5–5.1)
RBC # BLD AUTO: 4.35 M/UL (ref 4.2–5.9)
REPORT: NORMAL
RESP PATH DNA+RNA PNL NPH NAA+NON-PROBE: NORMAL
SODIUM SERPL-SCNC: 140 MMOL/L (ref 136–145)
TRIGL SERPL-MCNC: 159 MG/DL (ref 0–150)
TROPONIN, HIGH SENSITIVITY: 14 NG/L (ref 0–22)
VLDLC SERPL CALC-MCNC: 32 MG/DL
WBC # BLD AUTO: 4.1 K/UL (ref 4–11)

## 2025-06-21 PROCEDURE — 1200000000 HC SEMI PRIVATE

## 2025-06-21 PROCEDURE — 83036 HEMOGLOBIN GLYCOSYLATED A1C: CPT

## 2025-06-21 PROCEDURE — 99223 1ST HOSP IP/OBS HIGH 75: CPT | Performed by: INTERNAL MEDICINE

## 2025-06-21 PROCEDURE — 6370000000 HC RX 637 (ALT 250 FOR IP): Performed by: INTERNAL MEDICINE

## 2025-06-21 PROCEDURE — 6360000002 HC RX W HCPCS: Performed by: INTERNAL MEDICINE

## 2025-06-21 PROCEDURE — 84484 ASSAY OF TROPONIN QUANT: CPT

## 2025-06-21 PROCEDURE — 80048 BASIC METABOLIC PNL TOTAL CA: CPT

## 2025-06-21 PROCEDURE — 36415 COLL VENOUS BLD VENIPUNCTURE: CPT

## 2025-06-21 PROCEDURE — 83735 ASSAY OF MAGNESIUM: CPT

## 2025-06-21 PROCEDURE — 80061 LIPID PANEL: CPT

## 2025-06-21 PROCEDURE — 85025 COMPLETE CBC W/AUTO DIFF WBC: CPT

## 2025-06-21 RX ORDER — NITROGLYCERIN 0.4 MG/1
0.8 TABLET SUBLINGUAL PRN
Status: COMPLETED | OUTPATIENT
Start: 2025-06-21 | End: 2025-06-22

## 2025-06-21 RX ORDER — MECLIZINE HCL 12.5 MG 12.5 MG/1
12.5 TABLET ORAL 3 TIMES DAILY PRN
Status: DISCONTINUED | OUTPATIENT
Start: 2025-06-21 | End: 2025-06-23 | Stop reason: HOSPADM

## 2025-06-21 RX ORDER — SODIUM CHLORIDE 9 MG/ML
INJECTION, SOLUTION INTRAVENOUS PRN
Status: DISCONTINUED | OUTPATIENT
Start: 2025-06-21 | End: 2025-06-23 | Stop reason: HOSPADM

## 2025-06-21 RX ORDER — ENOXAPARIN SODIUM 100 MG/ML
30 INJECTION SUBCUTANEOUS 2 TIMES DAILY
Status: DISCONTINUED | OUTPATIENT
Start: 2025-06-21 | End: 2025-06-23 | Stop reason: HOSPADM

## 2025-06-21 RX ORDER — METOPROLOL TARTRATE 1 MG/ML
5 INJECTION, SOLUTION INTRAVENOUS EVERY 5 MIN PRN
Status: DISCONTINUED | OUTPATIENT
Start: 2025-06-21 | End: 2025-06-23 | Stop reason: HOSPADM

## 2025-06-21 RX ORDER — MAGNESIUM HYDROXIDE/ALUMINUM HYDROXICE/SIMETHICONE 120; 1200; 1200 MG/30ML; MG/30ML; MG/30ML
30 SUSPENSION ORAL EVERY 6 HOURS PRN
Status: DISCONTINUED | OUTPATIENT
Start: 2025-06-21 | End: 2025-06-23 | Stop reason: HOSPADM

## 2025-06-21 RX ORDER — METOPROLOL TARTRATE 50 MG
100 TABLET ORAL PRN
Status: COMPLETED | OUTPATIENT
Start: 2025-06-21 | End: 2025-06-22

## 2025-06-21 RX ORDER — SODIUM CHLORIDE 0.9 % (FLUSH) 0.9 %
5-40 SYRINGE (ML) INJECTION EVERY 12 HOURS SCHEDULED
Status: DISCONTINUED | OUTPATIENT
Start: 2025-06-21 | End: 2025-06-23 | Stop reason: HOSPADM

## 2025-06-21 RX ORDER — METOPROLOL TARTRATE 50 MG
150 TABLET ORAL PRN
Status: COMPLETED | OUTPATIENT
Start: 2025-06-21 | End: 2025-06-22

## 2025-06-21 RX ORDER — NITROGLYCERIN 0.4 MG/1
0.4 TABLET SUBLINGUAL PRN
Status: COMPLETED | OUTPATIENT
Start: 2025-06-21 | End: 2025-06-22

## 2025-06-21 RX ORDER — ENOXAPARIN SODIUM 150 MG/ML
1 INJECTION SUBCUTANEOUS 2 TIMES DAILY
Status: DISCONTINUED | OUTPATIENT
Start: 2025-06-21 | End: 2025-06-21

## 2025-06-21 RX ORDER — PANTOPRAZOLE SODIUM 40 MG/1
40 TABLET, DELAYED RELEASE ORAL
Status: DISCONTINUED | OUTPATIENT
Start: 2025-06-21 | End: 2025-06-23 | Stop reason: HOSPADM

## 2025-06-21 RX ORDER — SODIUM CHLORIDE 0.9 % (FLUSH) 0.9 %
5-40 SYRINGE (ML) INJECTION PRN
Status: DISCONTINUED | OUTPATIENT
Start: 2025-06-21 | End: 2025-06-23 | Stop reason: HOSPADM

## 2025-06-21 RX ORDER — METOPROLOL TARTRATE 50 MG
50 TABLET ORAL PRN
Status: COMPLETED | OUTPATIENT
Start: 2025-06-21 | End: 2025-06-22

## 2025-06-21 RX ADMIN — ATORVASTATIN CALCIUM 20 MG: 20 TABLET, FILM COATED ORAL at 09:11

## 2025-06-21 RX ADMIN — POTASSIUM CHLORIDE 40 MEQ: 1500 TABLET, EXTENDED RELEASE ORAL at 05:56

## 2025-06-21 RX ADMIN — ENOXAPARIN SODIUM 30 MG: 100 INJECTION SUBCUTANEOUS at 20:51

## 2025-06-21 RX ADMIN — PANTOPRAZOLE SODIUM 40 MG: 40 TABLET, DELAYED RELEASE ORAL at 09:11

## 2025-06-21 RX ADMIN — ASPIRIN 81 MG: 81 TABLET, CHEWABLE ORAL at 09:11

## 2025-06-21 RX ADMIN — ENOXAPARIN SODIUM 135 MG: 150 INJECTION SUBCUTANEOUS at 00:11

## 2025-06-21 RX ADMIN — ALUMINUM HYDROXIDE, MAGNESIUM HYDROXIDE, AND SIMETHICONE 30 ML: 200; 200; 20 SUSPENSION ORAL at 09:13

## 2025-06-21 NOTE — CONSULTS
MOUTH EVERY DAY 10/26/18  Yes Latoya Hudson MD   losartan-hydrochlorothiazide (HYZAAR) 100-25 MG per tablet TAKE 1 TABLET BY MOUTH DAILY 10/26/18  Yes Latoya Hudson MD   metoprolol succinate (TOPROL XL) 50 MG extended release tablet TAKE 1 TABLET BY MOUTH DAILY 10/26/18  Yes Latoya Hudson MD   ibuprofen (ADVIL;MOTRIN) 800 MG tablet Take 1 tablet by mouth every 8 hours as needed for Pain 17  Yes Natan Wylie PA-C   EPIPEN 2-NATIVIDAD 0.3 MG/0.3ML SOAJ injection ADM 0.3 ML IM 1 TIME FOR 1 DOSE UTD FOR ALLERGIC REACTION 18   Provider, MD Griffin       Physical Examination:  Vitals:    25 0832   BP: 115/62   Pulse: 56   Resp: 18   Temp: 97.5 °F (36.4 °C)   SpO2: 99%      In: 720 [P.O.:720]  Out: 400    Wt Readings from Last 3 Encounters:   25 (!) 141.3 kg (311 lb 8 oz)   24 (!) 141.4 kg (311 lb 12.8 oz)   20 134.7 kg (297 lb)     Temp  Av.5 °F (36.4 °C)  Min: 96.8 °F (36 °C)  Max: 98 °F (36.7 °C)  Pulse  Av.6  Min: 50  Max: 72  BP  Min: 103/59  Max: 158/90  SpO2  Av.9 %  Min: 95 %  Max: 100 %    Intake/Output Summary (Last 24 hours) at 2025 1128  Last data filed at 2025 0000  Gross per 24 hour   Intake 720 ml   Output 400 ml   Net 320 ml       Constitutional: Oriented. No distress.   Cardiovascular: Normal rate, regular rhythm, S1&S2.    Pulmonary/Chest: Bilateral respiratory sounds. No rhonchi.    Abdominal: Soft. No tenderness   Musculoskeletal: No edema    Neurological: Alert and oriented. Follows command    Active Hospital Problems    Diagnosis Date Noted    Dizziness [R42] 2025    NSTEMI (non-ST elevated myocardial infarction) (HCC) [I21.4] 2025    Obstructive sleep apnea [G47.33]     Morbid obesity with BMI of 40.0-44.9, adult (HCC) [E66.01, Z68.41] 10/21/2014    HTN (hypertension) [I10] 2014       Past Medical History:   Diagnosis Date    Adenomatous polyp of colon 10/19/2017    Colonoscopy 10/2017

## 2025-06-21 NOTE — PLAN OF CARE
Problem: Discharge Planning  Goal: Discharge to home or other facility with appropriate resources  Outcome: Progressing     Problem: Pain  Goal: Verbalizes/displays adequate comfort level or baseline comfort level  Outcome: Progressing     Problem: Safety - Adult  Goal: Free from fall injury  Outcome: Progressing     Problem: Cardiovascular - Adult  Goal: Maintains optimal cardiac output and hemodynamic stability  Outcome: Progressing  Goal: Absence of cardiac dysrhythmias or at baseline  Outcome: Progressing     Problem: Metabolic/Fluid and Electrolytes - Adult  Goal: Electrolytes maintained within normal limits  Outcome: Progressing

## 2025-06-22 ENCOUNTER — APPOINTMENT (OUTPATIENT)
Dept: CT IMAGING | Age: 55
DRG: 641 | End: 2025-06-22
Payer: COMMERCIAL

## 2025-06-22 LAB
ANION GAP SERPL CALCULATED.3IONS-SCNC: 11 MMOL/L (ref 3–16)
BASOPHILS # BLD: 0 K/UL (ref 0–0.2)
BASOPHILS NFR BLD: 1.3 %
BUN SERPL-MCNC: 13 MG/DL (ref 7–20)
CALCIUM SERPL-MCNC: 8.5 MG/DL (ref 8.3–10.6)
CHLORIDE SERPL-SCNC: 108 MMOL/L (ref 99–110)
CO2 SERPL-SCNC: 23 MMOL/L (ref 21–32)
CREAT SERPL-MCNC: 1 MG/DL (ref 0.9–1.3)
DEPRECATED RDW RBC AUTO: 13.2 % (ref 12.4–15.4)
EOSINOPHIL # BLD: 0.2 K/UL (ref 0–0.6)
EOSINOPHIL NFR BLD: 5.2 %
GFR SERPLBLD CREATININE-BSD FMLA CKD-EPI: 90 ML/MIN/{1.73_M2}
GLUCOSE SERPL-MCNC: 140 MG/DL (ref 70–99)
HCT VFR BLD AUTO: 36.2 % (ref 40.5–52.5)
HGB BLD-MCNC: 12.4 G/DL (ref 13.5–17.5)
LYMPHOCYTES # BLD: 1.4 K/UL (ref 1–5.1)
LYMPHOCYTES NFR BLD: 40.2 %
MAGNESIUM SERPL-MCNC: 1.89 MG/DL (ref 1.8–2.4)
MCH RBC QN AUTO: 30.4 PG (ref 26–34)
MCHC RBC AUTO-ENTMCNC: 34.3 G/DL (ref 31–36)
MCV RBC AUTO: 88.7 FL (ref 80–100)
MONOCYTES # BLD: 0.3 K/UL (ref 0–1.3)
MONOCYTES NFR BLD: 7.6 %
NEUTROPHILS # BLD: 1.6 K/UL (ref 1.7–7.7)
NEUTROPHILS NFR BLD: 45.7 %
PLATELET # BLD AUTO: 213 K/UL (ref 135–450)
PMV BLD AUTO: 7.9 FL (ref 5–10.5)
POTASSIUM SERPL-SCNC: 3.3 MMOL/L (ref 3.5–5.1)
RBC # BLD AUTO: 4.08 M/UL (ref 4.2–5.9)
SODIUM SERPL-SCNC: 142 MMOL/L (ref 136–145)
WBC # BLD AUTO: 3.4 K/UL (ref 4–11)

## 2025-06-22 PROCEDURE — 1200000000 HC SEMI PRIVATE

## 2025-06-22 PROCEDURE — 80048 BASIC METABOLIC PNL TOTAL CA: CPT

## 2025-06-22 PROCEDURE — 83735 ASSAY OF MAGNESIUM: CPT

## 2025-06-22 PROCEDURE — 2500000003 HC RX 250 WO HCPCS: Performed by: INTERNAL MEDICINE

## 2025-06-22 PROCEDURE — 75574 CT ANGIO HRT W/3D IMAGE: CPT

## 2025-06-22 PROCEDURE — 85025 COMPLETE CBC W/AUTO DIFF WBC: CPT

## 2025-06-22 PROCEDURE — 6360000002 HC RX W HCPCS: Performed by: INTERNAL MEDICINE

## 2025-06-22 PROCEDURE — 6360000004 HC RX CONTRAST MEDICATION: Performed by: INTERNAL MEDICINE

## 2025-06-22 PROCEDURE — 6370000000 HC RX 637 (ALT 250 FOR IP): Performed by: INTERNAL MEDICINE

## 2025-06-22 RX ORDER — IOPAMIDOL 755 MG/ML
75 INJECTION, SOLUTION INTRAVASCULAR
Status: COMPLETED | OUTPATIENT
Start: 2025-06-22 | End: 2025-06-22

## 2025-06-22 RX ADMIN — SODIUM CHLORIDE, PRESERVATIVE FREE 10 ML: 5 INJECTION INTRAVENOUS at 20:16

## 2025-06-22 RX ADMIN — Medication 10 ML: at 20:16

## 2025-06-22 RX ADMIN — ATORVASTATIN CALCIUM 20 MG: 20 TABLET, FILM COATED ORAL at 09:33

## 2025-06-22 RX ADMIN — IOPAMIDOL 75 ML: 755 INJECTION, SOLUTION INTRAVENOUS at 11:44

## 2025-06-22 RX ADMIN — PANTOPRAZOLE SODIUM 40 MG: 40 TABLET, DELAYED RELEASE ORAL at 05:16

## 2025-06-22 RX ADMIN — ENOXAPARIN SODIUM 30 MG: 100 INJECTION SUBCUTANEOUS at 09:33

## 2025-06-22 RX ADMIN — METOPROLOL TARTRATE 5 MG: 5 INJECTION INTRAVENOUS at 11:51

## 2025-06-22 RX ADMIN — ACETAMINOPHEN 650 MG: 325 TABLET ORAL at 12:17

## 2025-06-22 RX ADMIN — METOPROLOL TARTRATE 50 MG: 50 TABLET, FILM COATED ORAL at 10:02

## 2025-06-22 RX ADMIN — POTASSIUM CHLORIDE 40 MEQ: 1500 TABLET, EXTENDED RELEASE ORAL at 10:02

## 2025-06-22 RX ADMIN — NITROGLYCERIN 0.8 MG: 0.4 TABLET SUBLINGUAL at 11:52

## 2025-06-22 RX ADMIN — Medication 10 ML: at 09:51

## 2025-06-22 RX ADMIN — ASPIRIN 81 MG: 81 TABLET, CHEWABLE ORAL at 09:33

## 2025-06-22 ASSESSMENT — PAIN SCALES - GENERAL
PAINLEVEL_OUTOF10: 0
PAINLEVEL_OUTOF10: 3

## 2025-06-22 ASSESSMENT — PAIN DESCRIPTION - LOCATION: LOCATION: HEAD

## 2025-06-23 ENCOUNTER — ANCILLARY PROCEDURE (OUTPATIENT)
Dept: CARDIOLOGY CLINIC | Age: 55
End: 2025-06-23

## 2025-06-23 ENCOUNTER — APPOINTMENT (OUTPATIENT)
Age: 55
DRG: 641 | End: 2025-06-23
Attending: INTERNAL MEDICINE
Payer: COMMERCIAL

## 2025-06-23 VITALS
OXYGEN SATURATION: 99 % | WEIGHT: 313 LBS | HEIGHT: 72 IN | SYSTOLIC BLOOD PRESSURE: 119 MMHG | RESPIRATION RATE: 16 BRPM | TEMPERATURE: 97.7 F | HEART RATE: 64 BPM | DIASTOLIC BLOOD PRESSURE: 80 MMHG | BODY MASS INDEX: 42.39 KG/M2

## 2025-06-23 DIAGNOSIS — R55 PRE-SYNCOPE: ICD-10-CM

## 2025-06-23 DIAGNOSIS — R55 PRE-SYNCOPE: Primary | ICD-10-CM

## 2025-06-23 LAB
ANION GAP SERPL CALCULATED.3IONS-SCNC: 11 MMOL/L (ref 3–16)
BASOPHILS # BLD: 0 K/UL (ref 0–0.2)
BASOPHILS NFR BLD: 1.4 %
BUN SERPL-MCNC: 11 MG/DL (ref 7–20)
CALCIUM SERPL-MCNC: 8.6 MG/DL (ref 8.3–10.6)
CHLORIDE SERPL-SCNC: 107 MMOL/L (ref 99–110)
CO2 SERPL-SCNC: 23 MMOL/L (ref 21–32)
CREAT SERPL-MCNC: 1 MG/DL (ref 0.9–1.3)
DEPRECATED RDW RBC AUTO: 12.9 % (ref 12.4–15.4)
ECHO AO ASC DIAM: 3.1 CM
ECHO AO ASCENDING AORTA INDEX: 1.2 CM/M2
ECHO AO ROOT DIAM: 3.7 CM
ECHO AO ROOT INDEX: 1.43 CM/M2
ECHO AV AREA PEAK VELOCITY: 3.6 CM2
ECHO AV AREA/BSA PEAK VELOCITY: 1.4 CM2/M2
ECHO AV PEAK GRADIENT: 7 MMHG
ECHO AV PEAK VELOCITY: 1.3 M/S
ECHO AV VELOCITY RATIO: 0.85
ECHO BSA: 2.69 M2
ECHO BSA: 2.69 M2
ECHO EST RA PRESSURE: 3 MMHG
ECHO IVC INSP: 0.5 CM
ECHO IVC PROX: 1.8 CM
ECHO LA AREA 2C: 33.3 CM2
ECHO LA AREA 4C: 31.7 CM2
ECHO LA MAJOR AXIS: 6.1 CM
ECHO LA MINOR AXIS: 6.1 CM
ECHO LA VOL BP: 143 ML (ref 18–58)
ECHO LA VOL MOD A2C: 147 ML (ref 18–58)
ECHO LA VOL MOD A4C: 139 ML (ref 18–58)
ECHO LA VOL/BSA BIPLANE: 55 ML/M2 (ref 16–34)
ECHO LA VOLUME INDEX MOD A2C: 57 ML/M2 (ref 16–34)
ECHO LA VOLUME INDEX MOD A4C: 54 ML/M2 (ref 16–34)
ECHO LV E' LATERAL VELOCITY: 10.4 CM/S
ECHO LV E' SEPTAL VELOCITY: 9.81 CM/S
ECHO LV EDV 3D: 180 ML
ECHO LV EDV INDEX 3D: 70 ML/M2
ECHO LV EF PHYSICIAN: 63 %
ECHO LV ESV 3D: 55 ML
ECHO LV ESV INDEX 3D: 21 ML/M2
ECHO LV FRACTIONAL SHORTENING: 37 % (ref 28–44)
ECHO LV INTERNAL DIMENSION DIASTOLE INDEX: 2.09 CM/M2
ECHO LV INTERNAL DIMENSION DIASTOLIC: 5.4 CM (ref 4.2–5.9)
ECHO LV INTERNAL DIMENSION SYSTOLIC INDEX: 1.32 CM/M2
ECHO LV INTERNAL DIMENSION SYSTOLIC: 3.4 CM
ECHO LV IVSD: 0.9 CM (ref 0.6–1)
ECHO LV MASS 2D: 193.3 G (ref 88–224)
ECHO LV MASS 3D INDEX: 95 G/M2
ECHO LV MASS 3D: 245 G
ECHO LV MASS INDEX 2D: 74.9 G/M2 (ref 49–115)
ECHO LV POSTERIOR WALL DIASTOLIC: 1 CM (ref 0.6–1)
ECHO LV RELATIVE WALL THICKNESS RATIO: 0.37
ECHO LVOT AREA: 4.2 CM2
ECHO LVOT DIAM: 2.3 CM
ECHO LVOT MEAN GRADIENT: 3 MMHG
ECHO LVOT PEAK GRADIENT: 5 MMHG
ECHO LVOT PEAK VELOCITY: 1.1 M/S
ECHO LVOT STROKE VOLUME INDEX: 35.9 ML/M2
ECHO LVOT SV: 92.6 ML
ECHO LVOT VTI: 22.3 CM
ECHO MV A VELOCITY: 0.63 M/S
ECHO MV E VELOCITY: 0.93 M/S
ECHO MV E/A RATIO: 1.48
ECHO MV E/E' LATERAL: 8.94
ECHO MV E/E' RATIO (AVERAGED): 9.21
ECHO MV E/E' SEPTAL: 9.48
ECHO PV MAX VELOCITY: 1.2 M/S
ECHO PV PEAK GRADIENT: 5 MMHG
ECHO RA AREA 4C: 22.7 CM2
ECHO RA END SYSTOLIC VOLUME APICAL 4 CHAMBER INDEX BSA: 26 ML/M2
ECHO RA VOLUME: 67 ML
ECHO RV BASAL DIMENSION: 4.3 CM
ECHO RV FREE WALL PEAK S': 12 CM/S
ECHO RV LONGITUDINAL DIMENSION: 9.2 CM
ECHO RV MID DIMENSION: 3.1 CM
ECHO RV TAPSE: 2.7 CM (ref 1.7–?)
EOSINOPHIL # BLD: 0.2 K/UL (ref 0–0.6)
EOSINOPHIL NFR BLD: 5.5 %
GFR SERPLBLD CREATININE-BSD FMLA CKD-EPI: 90 ML/MIN/{1.73_M2}
GLUCOSE SERPL-MCNC: 116 MG/DL (ref 70–99)
HCT VFR BLD AUTO: 37.3 % (ref 40.5–52.5)
HGB BLD-MCNC: 12.8 G/DL (ref 13.5–17.5)
LYMPHOCYTES # BLD: 1.3 K/UL (ref 1–5.1)
LYMPHOCYTES NFR BLD: 39.2 %
MCH RBC QN AUTO: 30.5 PG (ref 26–34)
MCHC RBC AUTO-ENTMCNC: 34.4 G/DL (ref 31–36)
MCV RBC AUTO: 88.7 FL (ref 80–100)
MONOCYTES # BLD: 0.3 K/UL (ref 0–1.3)
MONOCYTES NFR BLD: 8 %
NEUTROPHILS # BLD: 1.6 K/UL (ref 1.7–7.7)
NEUTROPHILS NFR BLD: 45.9 %
PLATELET # BLD AUTO: 200 K/UL (ref 135–450)
PMV BLD AUTO: 7.6 FL (ref 5–10.5)
POTASSIUM SERPL-SCNC: 3.8 MMOL/L (ref 3.5–5.1)
RBC # BLD AUTO: 4.21 M/UL (ref 4.2–5.9)
SODIUM SERPL-SCNC: 141 MMOL/L (ref 136–145)
WBC # BLD AUTO: 3.4 K/UL (ref 4–11)

## 2025-06-23 PROCEDURE — 76376 3D RENDER W/INTRP POSTPROCES: CPT

## 2025-06-23 PROCEDURE — 76376 3D RENDER W/INTRP POSTPROCES: CPT | Performed by: INTERNAL MEDICINE

## 2025-06-23 PROCEDURE — 75574 CT ANGIO HRT W/3D IMAGE: CPT | Performed by: INTERNAL MEDICINE

## 2025-06-23 PROCEDURE — 93306 TTE W/DOPPLER COMPLETE: CPT | Performed by: INTERNAL MEDICINE

## 2025-06-23 PROCEDURE — 85025 COMPLETE CBC W/AUTO DIFF WBC: CPT

## 2025-06-23 PROCEDURE — 80048 BASIC METABOLIC PNL TOTAL CA: CPT

## 2025-06-23 PROCEDURE — 6370000000 HC RX 637 (ALT 250 FOR IP): Performed by: INTERNAL MEDICINE

## 2025-06-23 PROCEDURE — 36415 COLL VENOUS BLD VENIPUNCTURE: CPT

## 2025-06-23 RX ORDER — ASPIRIN 81 MG/1
81 TABLET, CHEWABLE ORAL DAILY
Qty: 30 TABLET | Refills: 0 | Status: SHIPPED | OUTPATIENT
Start: 2025-06-24

## 2025-06-23 RX ORDER — MECLIZINE HCL 12.5 MG 12.5 MG/1
12.5 TABLET ORAL 3 TIMES DAILY PRN
Qty: 20 TABLET | Refills: 0 | Status: SHIPPED | OUTPATIENT
Start: 2025-06-23 | End: 2025-07-03

## 2025-06-23 RX ADMIN — PANTOPRAZOLE SODIUM 40 MG: 40 TABLET, DELAYED RELEASE ORAL at 05:06

## 2025-06-23 RX ADMIN — ASPIRIN 81 MG: 81 TABLET, CHEWABLE ORAL at 10:27

## 2025-06-23 RX ADMIN — ATORVASTATIN CALCIUM 20 MG: 20 TABLET, FILM COATED ORAL at 10:27

## 2025-06-23 RX ADMIN — ALUMINUM HYDROXIDE, MAGNESIUM HYDROXIDE, AND SIMETHICONE 30 ML: 200; 200; 20 SUSPENSION ORAL at 10:27

## 2025-06-23 ASSESSMENT — PAIN DESCRIPTION - LOCATION: LOCATION: ABDOMEN

## 2025-06-23 ASSESSMENT — PAIN SCALES - GENERAL: PAINLEVEL_OUTOF10: 2

## 2025-06-23 NOTE — PLAN OF CARE
Problem: Discharge Planning  Goal: Discharge to home or other facility with appropriate resources  6/22/2025 2251 by Amilcar Hawkins RN  Outcome: Progressing  6/22/2025 1811 by Ugo aKt RN  Outcome: Progressing     Problem: Pain  Goal: Verbalizes/displays adequate comfort level or baseline comfort level  6/22/2025 2251 by Amilcar Hawkins RN  Outcome: Progressing  6/22/2025 1811 by Ugo Kat RN  Outcome: Progressing     Problem: Safety - Adult  Goal: Free from fall injury  6/22/2025 2251 by Amilcar Hawkins RN  Outcome: Progressing  6/22/2025 1811 by Ugo Kat RN  Outcome: Progressing     Problem: Cardiovascular - Adult  Goal: Maintains optimal cardiac output and hemodynamic stability  6/22/2025 2251 by Amilcar Hawkins RN  Outcome: Progressing  6/22/2025 1811 by Ugo Kat RN  Outcome: Progressing  Goal: Absence of cardiac dysrhythmias or at baseline  6/22/2025 2251 by Amilcar Hawkins RN  Outcome: Progressing  6/22/2025 1811 by Ugo Kat RN  Outcome: Progressing     Problem: Metabolic/Fluid and Electrolytes - Adult  Goal: Electrolytes maintained within normal limits  6/22/2025 2251 by Amilcar Hawkins RN  Outcome: Progressing  6/22/2025 1811 by Ugo Kat RN  Outcome: Progressing

## 2025-06-23 NOTE — NURSING NOTE
14 day monitor placed on the patient here in clinic today. Reviewed proper care and instructions with the patient.     SN : [id number]

## 2025-06-23 NOTE — CARE COORDINATION
Case Management Assessment  Initial Evaluation    Date/Time of Evaluation: 6/23/2025 11:57 AM  Assessment Completed by: Justyna Barton RN    If patient is discharged prior to next notation, then this note serves as note for discharge by case management.    Patient Name: Monico Majano                   YOB: 1970  Diagnosis: Hypokalemia [E87.6]  Hypomagnesemia [E83.42]  Lightheadedness [R42]  Elevated troponin [R79.89]  NSTEMI (non-ST elevated myocardial infarction) (HCC) [I21.4]  Chest pain, unspecified type [R07.9]                   Date / Time: 6/20/2025 12:09 PM    Patient Admission Status: Inpatient   Readmission Risk (Low < 19, Mod (19-27), High > 27): Readmission Risk Score: 5.6    Current PCP: Radha Barrios MD  PCP verified by CM? Yes    Chart Reviewed: Yes      History Provided by: Patient  Patient Orientation: Alert and Oriented    Patient Cognition: Alert    Hospitalization in the last 30 days (Readmission):  No    If yes, Readmission Assessment in  Navigator will be completed.    Advance Directives:      Code Status: Full Code   Patient's Primary Decision Maker is: Legal Next of Kin      Discharge Planning:    Patient lives with: Parent Type of Home: House  Primary Care Giver: Self  Patient Support Systems include: Parent, Children, Friends/Neighbors   Current Financial resources: Other (Comment) (commercial)  Current community resources: None  Current services prior to admission: None            Current DME:              Type of Home Care services:  None    ADLS  Prior functional level: Independent in ADLs/IADLs  Current functional level: Independent in ADLs/IADLs    PT AM-PAC:   /24  OT AM-PAC:   /24    Family can provide assistance at DC: Yes  Would you like Case Management to discuss the discharge plan with any other family members/significant others, and if so, who? No  Plans to Return to Present Housing: Yes  Other Identified Issues/Barriers to RETURNING to current housing:

## 2025-06-24 NOTE — DISCHARGE SUMMARY
Hospital Medicine Discharge Summary    Patient: Monico Majano     Gender: male  : 1970   Age: 54 y.o.  MRN: [id number]    Admitting Physician: Raf Lepe MD  Discharge Physician: Raf Lepe MD     Code Status: Full code    Admit Date: 2025   Discharge Date: 2025      Disposition:  Home    Discharge Diagnoses:    Active Hospital Problems    Diagnosis Date Noted    Lightheadedness [R42] 2025    NSTEMI (non-ST elevated myocardial infarction) (HCC) [I21.4] 2025    Obstructive sleep apnea [G47.33]     Morbid obesity with BMI of 40.0-44.9, adult (HCC) [E66.01, Z68.41] 10/21/2014    HTN (hypertension) [I10] 2014       Follow-up appointments:  one week    Outpatient to do list: F/U with PCP and EP    Condition at Discharge:  Stable    Hospital Course:   54 y.o. male with history of HTN, morbid obesity came to ER with acute onset of dizziness while driving.  Patient was going to  his child and came to ER because he felt very lightheaded.  No CP, but felt palpitations.  Admitted as inpatient.     Viral panel negative.    UA negative for UTI, SG > 1.030.  On IVF     CT Chest:  IMPRESSION:  No evidence of pulmonary embolism or acute pulmonary abnormality.     No NSTEMI.  Seen by EP.    S/P coronary CTA:  IMPRESSION:  .     RCA: There is non-calcified plaque in the distal segment with 25-50% stenosis  that does not appear obstructive. There is significant motion artifact in  this region limiting the sensitivity and specificity of this finding.     CAD RADS 2      Echo.    Left Ventricle: Normal left ventricular systolic function with a visually estimated EF of 60 - 65%. Left ventricle size is normal. Normal wall thickness. Normal wall motion. Normal diastolic function.    Right Ventricle: Right ventricle size is normal. Normal systolic function.    IVC/SVC: IVC diameter is normal or and decreases greater than 50% during inspiration; therefore the estimated right atrial  Render Note In Bullet Format When Appropriate: No Consent: The patient's consent was obtained including but not limited to risks of crusting, scabbing, blistering, scarring, darker or lighter pigmentary change, recurrence, incomplete removal and infection. Render Post-Care Instructions In Note?: yes Detail Level: Detailed Duration Of Freeze Thaw-Cycle (Seconds): 5 Post-Care Instructions: I reviewed with the patient in detail post-care instructions. Patient is to wear sunprotection, and avoid picking at any of the treated lesions. Pt may apply Vaseline to crusted or scabbing areas. Number Of Freeze-Thaw Cycles: 2 freeze-thaw cycles

## 2025-06-26 NOTE — PROGRESS NOTES
Hospitalist Progress Note      PCP: Radha Barrios MD    Date of Admission: 6/20/2025    Chief Complaint: Lightheadedness    Hospital Course: 54 y.o. male with history of HTN, morbid obesity came to ER with acute onset of dizziness while driving.  Patient was going to  his child and came to ER because he felt very lightheaded.  No CP, but felt palpitations.  Admitted as inpatient.     Viral panel negative.    UA negative for UTI, SG > 1.030.  On IVF    CT Chest:  IMPRESSION:  No evidence of pulmonary embolism or acute pulmonary abnormality.    No NSTEMI.  Seen by EP.    For coronary CTA and Echo.    Subjective:  Patient seen after coronary CTA.  No CP, SOB, HA or fevers.  No abdominal pain.       Medications:  Reviewed    Infusion Medications    sodium chloride      sodium chloride       Scheduled Medications    pantoprazole  40 mg Oral QAM AC    sodium chloride flush  5-40 mL IntraVENous 2 times per day    enoxaparin  30 mg SubCUTAneous BID    atorvastatin  20 mg Oral Daily    sodium chloride flush  5-40 mL IntraVENous 2 times per day    aspirin  81 mg Oral Daily     PRN Meds: meclizine, aluminum & magnesium hydroxide-simethicone, metoprolol, sodium chloride flush, sodium chloride, sodium chloride flush, sodium chloride, potassium chloride **OR** potassium alternative oral replacement **OR** potassium chloride, magnesium sulfate, ondansetron **OR** ondansetron, polyethylene glycol, acetaminophen **OR** acetaminophen, sulfur hexafluoride microspheres      Intake/Output Summary (Last 24 hours) at 6/22/2025 1345  Last data filed at 6/22/2025 0515  Gross per 24 hour   Intake 240 ml   Output --   Net 240 ml       Physical Exam Performed:    BP (!) 140/79   Pulse 66   Temp 98.2 °F (36.8 °C)   Resp 16   Ht 1.829 m (6')   Wt (!) 142.3 kg (313 lb 12.8 oz)   SpO2 100%   BMI 42.56 kg/m²     General appearance:  Appears comfortable. Well nourished  Eyes: Sclera clear, pupils equal  ENT: Moist mucus 
      Hospitalist Progress Note      PCP: Radha Barrios MD    Date of Admission: 6/20/2025    Chief Complaint: Lightheadedness    Hospital Course: 54 y.o. male with history of HTN, morbid obesity came to ER with acute onset of dizziness while driving.  Patient was going to  his child and came to ER because he felt very lightheaded.  No CP, but felt palpitations.  Admitted as inpatient.     Viral panel negative.    UA negative for UTI, SG > 1.030.  On IVF    CT Chest:  IMPRESSION:  No evidence of pulmonary embolism or acute pulmonary abnormality.    No NSTEMI.  Seen by EP.    For coronary CTA and Echo.    Subjective:  Patient with low BP.  No CP, SOB, HA or fevers.  No abdominal pain.       Medications:  Reviewed    Infusion Medications    sodium chloride      sodium chloride      sodium chloride 75 mL/hr at 06/20/25 2135     Scheduled Medications    pantoprazole  40 mg Oral QAM AC    sodium chloride flush  5-40 mL IntraVENous 2 times per day    enoxaparin  30 mg SubCUTAneous BID    atorvastatin  20 mg Oral Daily    sodium chloride flush  5-40 mL IntraVENous 2 times per day    aspirin  81 mg Oral Daily     PRN Meds: meclizine, aluminum & magnesium hydroxide-simethicone, metoprolol tartrate **OR** metoprolol tartrate **OR** metoprolol tartrate, metoprolol, nitroGLYCERIN **OR** nitroGLYCERIN, sodium chloride flush, sodium chloride, sodium chloride flush, sodium chloride, potassium chloride **OR** potassium alternative oral replacement **OR** potassium chloride, magnesium sulfate, ondansetron **OR** ondansetron, polyethylene glycol, acetaminophen **OR** acetaminophen, sulfur hexafluoride microspheres      Intake/Output Summary (Last 24 hours) at 6/21/2025 1333  Last data filed at 6/21/2025 0000  Gross per 24 hour   Intake 720 ml   Output 400 ml   Net 320 ml       Physical Exam Performed:    /62   Pulse 56   Temp 97.5 °F (36.4 °C)   Resp 18   Ht 1.829 m (6')   Wt (!) 141.3 kg (311 lb 8 oz)   SpO2 99% 
  Physician Progress Note      PATIENT:               WEI BENITEZ  CSN #:                  [id number]  :                       1970  ADMIT DATE:       2025 12:09 PM  DISCH DATE:        2025 6:29 PM  RESPONDING  PROVIDER #:        Raf Lepe MD          QUERY TEXT:    Please clarify in documentation the relationship, if any, between   lightheadedness with palpitations and dehydration:    The clinical indicators include:  - Sudden onset of lightheadedness and palpitations while driving  - \"UA with dehydration, continue IVF\" documented in  progress note  - \"UA with dehydration, s/p IVF\" documented in  progress note  Options provided:  -- Lightheadedness with palpitations possibly related to dehydration  -- Lightheadedness with palpitations unrelated to dehydration  -- Other - I will add my own diagnosis  -- Disagree - Not applicable / Not valid  -- Disagree - Clinically unable to determine / Unknown  -- Refer to Clinical Documentation Reviewer    PROVIDER RESPONSE TEXT:    Lightheadedness with palpitations possibly related to dehydration    Query created by: Re Jules on 2025 3:20 AM      Electronically signed by:  Raf Lepe MD 2025 10:20 AM          
Admission assessment complete. VSS. Pt denies any lightheadedness, dizziness, CP, or SOB. Pt aware to call out for assistance if feeling dizzy. Call light within reach, non skid sock on. Pt aware of POC.   
CLINICAL PHARMACY NOTE: MEDS TO BEDS    Total # of Prescriptions Filled: 2   The following medications were delivered to the patient:  Meclizine  Asa 81 chew    Additional Documentation:  Luis Miguel mcpherson delivery, patient signed  
[facility]  Cardiology Note  [phone]      Chief Complaint   Patient presents with    Lightheadedness     Patient arrives through triage with complaints of lightheadedness and palpitations. Reports driving to get his gets when he suddenly felt lightheaded and his heart started racing. Denies SOB. Denies nausea.         History of Present Illness:  Monico Majano is a 54 y.o. patient PMHx HTN, obestiy who presented to the hospital with complaints of lightheadedness and elevated troponin    CCTA with non-obstructive distal RCA disease but significant artifact. TTE pending. No further episodes of lightheadedness noted.     Past Medical History:   has a past medical history of Adenomatous polyp of colon, GERD (gastroesophageal reflux disease), Hyperlipidemia, Hypertension, and Obstructive sleep apnea (adult) (pediatric).    Surgical History:   has a past surgical history that includes Ankle arthroscopy and Tonsillectomy and adenoidectomy.     Social History:   reports that he has quit smoking. He has never used smokeless tobacco. He reports current alcohol use. He reports that he does not use drugs.     Family History:  Family History   Problem Relation Age of Onset    Hypertension Mother     Heart Disease Father     Heart Disease Brother     Depression Brother        Home Medications:  Were reviewed and are listed in nursing record. and/or listed below  Prior to Admission medications    Medication Sig Start Date End Date Taking? Authorizing Provider   omeprazole (PRILOSEC) 40 MG delayed release capsule TAKE ONE CAPSULE BY MOUTH DAILY 5/6/19  Yes Latoya Hudson MD   amLODIPine (NORVASC) 5 MG tablet TAKE 1 TABLET BY MOUTH DAILY 10/26/18  Yes Latoya Hudson MD   atorvastatin (LIPITOR) 20 MG tablet TAKE 1 TABLET BY MOUTH EVERY DAY 10/26/18  Yes Latoya Hudson MD   losartan-hydrochlorothiazide (HYZAAR) 100-25 MG per tablet TAKE 1 TABLET BY MOUTH DAILY 10/26/18  Yes Osiel 
Pt developed blisters to R & L AC from bandaids and tape. No reaction seen w/ tegaderm. Intact blisters left open to air.   
in lower extremities  Respiratory: Clear to auscultation bilaterally, no wheeze, good inspiratory effort  Gastrointestinal: Abdomen soft, obese, non-tender, not distended, normal bowel sounds   Musculoskeletal: No cyanosis in digits, neck supple  Neurology: Grossly intact. Alert and oriented in time, place and person. No speech or motor deficits  Psychiatry: Appropriate affect. Not agitated  Skin: Warm, dry, normal turgor, no rash  Brisk capillary refill, peripheral pulses palpable       Labs:   Recent Labs     06/21/25  0430 06/22/25  0446 06/23/25  0504   WBC 4.1 3.4* 3.4*   HGB 13.2* 12.4* 12.8*   HCT 38.2* 36.2* 37.3*    213 200     Recent Labs     06/21/25  0430 06/22/25  0446 06/23/25  0504    142 141   K 3.1* 3.3* 3.8    108 107   CO2 24 23 23   BUN 13 13 11   CREATININE 0.9 1.0 1.0   CALCIUM 8.8 8.5 8.6     Recent Labs     06/20/25  1239   AST 22   ALT 22   BILITOT 1.3*   ALKPHOS 73     No results for input(s): \"INR\" in the last 72 hours.  Recent Labs     06/20/25  1437 06/20/25  1942 06/21/25  0430   TROPHS 23* 13 14       Urinalysis:      Lab Results   Component Value Date/Time    NITRU Negative 06/20/2025 09:35 PM    BLOODU Negative 06/20/2025 09:35 PM    GLUCOSEU Negative 06/20/2025 09:35 PM       Radiology:  CTA CARDIAC W C STRC MORP W CONTRAST   Final Result   .   RCA: There is non-calcified plaque in the distal segment with 25-50%    stenosis that does not appear obstructive. There is significant motion    artifact in this region limiting the sensitivity and specificity of this    finding.   CAD RADS 2    ---------------------------------------------------------------------------   --------------------------------      NON-CARDIAC FINDINGS - []            CT CHEST PULMONARY EMBOLISM W CONTRAST   Final Result   No evidence of pulmonary embolism or acute pulmonary abnormality.         HEARTFLOW FFR FRACTIONAL FLOW RESERVE ANALYSIS    (Results Pending)       IP CONSULT TO

## 2025-07-10 LAB — ECHO BSA: 2.69 M2

## 2025-07-25 ENCOUNTER — HOSPITAL ENCOUNTER (OUTPATIENT)
Age: 55
Discharge: HOME | End: 2025-07-25
Payer: COMMERCIAL

## 2025-07-25 DIAGNOSIS — E83.42 HYPOMAGNESEMIA: ICD-10-CM

## 2025-07-25 LAB
ANION GAP SERPL CALCULATED.3IONS-SCNC: 11 MMOL/L (ref 3–16)
BUN SERPL-MCNC: 14 MG/DL (ref 7–20)
CALCIUM SERPL-MCNC: 9.7 MG/DL (ref 8.3–10.6)
CHLORIDE SERPL-SCNC: 108 MMOL/L (ref 99–110)
CO2 SERPL-SCNC: 25 MMOL/L (ref 21–32)
CREAT SERPL-MCNC: 1.2 MG/DL (ref 0.9–1.3)
GFR SERPLBLD CREATININE-BSD FMLA CKD-EPI: 72 ML/MIN/{1.73_M2}
GLUCOSE SERPL-MCNC: 104 MG/DL (ref 70–99)
MAGNESIUM SERPL-MCNC: 1.78 MG/DL (ref 1.8–2.4)
POTASSIUM SERPL-SCNC: 4.2 MMOL/L (ref 3.5–5.1)
SODIUM SERPL-SCNC: 144 MMOL/L (ref 136–145)

## 2025-07-25 PROCEDURE — 80048 BASIC METABOLIC PNL TOTAL CA: CPT

## 2025-07-25 PROCEDURE — 83735 ASSAY OF MAGNESIUM: CPT

## 2025-07-25 PROCEDURE — 36415 COLL VENOUS BLD VENIPUNCTURE: CPT

## 2025-08-01 ENCOUNTER — RESULTS FOLLOW-UP (OUTPATIENT)
Dept: CARDIOLOGY CLINIC | Age: 55
End: 2025-08-01

## 2025-08-01 DIAGNOSIS — E83.42 HYPOMAGNESEMIA: Primary | ICD-10-CM

## 2025-08-01 RX ORDER — MAGNESIUM OXIDE 400 MG/1
200 TABLET ORAL DAILY
Qty: 15 TABLET | Refills: 1 | Status: SHIPPED | OUTPATIENT
Start: 2025-08-01

## 2025-08-12 ENCOUNTER — HOSPITAL ENCOUNTER (OUTPATIENT)
Age: 55
Discharge: HOME | End: 2025-08-12
Payer: COMMERCIAL

## 2025-08-12 DIAGNOSIS — E83.42 HYPOMAGNESEMIA: ICD-10-CM

## 2025-08-12 LAB — MAGNESIUM SERPL-MCNC: 1.87 MG/DL (ref 1.8–2.4)

## 2025-08-12 PROCEDURE — 36415 COLL VENOUS BLD VENIPUNCTURE: CPT

## 2025-08-12 PROCEDURE — 83735 ASSAY OF MAGNESIUM: CPT
